# Patient Record
Sex: FEMALE | Race: WHITE | NOT HISPANIC OR LATINO | Employment: OTHER | URBAN - METROPOLITAN AREA
[De-identification: names, ages, dates, MRNs, and addresses within clinical notes are randomized per-mention and may not be internally consistent; named-entity substitution may affect disease eponyms.]

---

## 2022-08-29 ENCOUNTER — TRANSFERRED RECORDS (OUTPATIENT)
Dept: HEALTH INFORMATION MANAGEMENT | Facility: CLINIC | Age: 72
End: 2022-08-29

## 2022-08-29 LAB — ABSTRACT IFOB-NO CHARGE: NEGATIVE

## 2022-09-29 ENCOUNTER — ANCILLARY PROCEDURE (OUTPATIENT)
Dept: MAMMOGRAPHY | Facility: OTHER | Age: 72
End: 2022-09-29
Payer: COMMERCIAL

## 2022-09-29 DIAGNOSIS — Z12.31 VISIT FOR SCREENING MAMMOGRAM: ICD-10-CM

## 2022-09-29 PROCEDURE — 77063 BREAST TOMOSYNTHESIS BI: CPT | Mod: TC | Performed by: RADIOLOGY

## 2022-09-29 PROCEDURE — 77067 SCR MAMMO BI INCL CAD: CPT | Mod: TC | Performed by: RADIOLOGY

## 2022-10-26 ENCOUNTER — OFFICE VISIT (OUTPATIENT)
Dept: FAMILY MEDICINE | Facility: OTHER | Age: 72
End: 2022-10-26
Payer: COMMERCIAL

## 2022-10-26 VITALS
RESPIRATION RATE: 16 BRPM | OXYGEN SATURATION: 99 % | HEART RATE: 55 BPM | BODY MASS INDEX: 36.72 KG/M2 | WEIGHT: 199.56 LBS | SYSTOLIC BLOOD PRESSURE: 132 MMHG | DIASTOLIC BLOOD PRESSURE: 74 MMHG | TEMPERATURE: 97.4 F | HEIGHT: 62 IN

## 2022-10-26 DIAGNOSIS — R73.01 IMPAIRED FASTING GLUCOSE: ICD-10-CM

## 2022-10-26 DIAGNOSIS — Z00.00 ENCOUNTER FOR MEDICARE ANNUAL WELLNESS EXAM: ICD-10-CM

## 2022-10-26 DIAGNOSIS — E78.5 HYPERLIPIDEMIA, UNSPECIFIED HYPERLIPIDEMIA TYPE: Primary | ICD-10-CM

## 2022-10-26 DIAGNOSIS — E66.01 MORBID OBESITY (H): ICD-10-CM

## 2022-10-26 DIAGNOSIS — Z76.89 ESTABLISHING CARE WITH NEW DOCTOR, ENCOUNTER FOR: ICD-10-CM

## 2022-10-26 LAB
ALBUMIN SERPL-MCNC: 4 G/DL (ref 3.4–5)
ALP SERPL-CCNC: 74 U/L (ref 40–150)
ALT SERPL W P-5'-P-CCNC: 22 U/L (ref 0–50)
ANION GAP SERPL CALCULATED.3IONS-SCNC: 3 MMOL/L (ref 3–14)
AST SERPL W P-5'-P-CCNC: 18 U/L (ref 0–45)
BILIRUB SERPL-MCNC: 0.4 MG/DL (ref 0.2–1.3)
BUN SERPL-MCNC: 11 MG/DL (ref 7–30)
CALCIUM SERPL-MCNC: 9.3 MG/DL (ref 8.5–10.1)
CHLORIDE BLD-SCNC: 105 MMOL/L (ref 94–109)
CHOLEST SERPL-MCNC: 234 MG/DL
CO2 SERPL-SCNC: 31 MMOL/L (ref 20–32)
CREAT SERPL-MCNC: 0.7 MG/DL (ref 0.52–1.04)
FASTING STATUS PATIENT QL REPORTED: YES
GFR SERPL CREATININE-BSD FRML MDRD: >90 ML/MIN/1.73M2
GLUCOSE BLD-MCNC: 118 MG/DL (ref 70–99)
HBA1C MFR BLD: 5.7 % (ref 0–5.6)
HDLC SERPL-MCNC: 57 MG/DL
LDLC SERPL CALC-MCNC: 151 MG/DL
NONHDLC SERPL-MCNC: 177 MG/DL
POTASSIUM BLD-SCNC: 4 MMOL/L (ref 3.4–5.3)
PROT SERPL-MCNC: 7.2 G/DL (ref 6.8–8.8)
SODIUM SERPL-SCNC: 139 MMOL/L (ref 133–144)
TRIGL SERPL-MCNC: 131 MG/DL

## 2022-10-26 PROCEDURE — 80061 LIPID PANEL: CPT | Performed by: NURSE PRACTITIONER

## 2022-10-26 PROCEDURE — 36415 COLL VENOUS BLD VENIPUNCTURE: CPT | Performed by: NURSE PRACTITIONER

## 2022-10-26 PROCEDURE — 83036 HEMOGLOBIN GLYCOSYLATED A1C: CPT | Performed by: NURSE PRACTITIONER

## 2022-10-26 PROCEDURE — 80053 COMPREHEN METABOLIC PANEL: CPT | Performed by: NURSE PRACTITIONER

## 2022-10-26 PROCEDURE — 99214 OFFICE O/P EST MOD 30 MIN: CPT | Mod: 25 | Performed by: NURSE PRACTITIONER

## 2022-10-26 PROCEDURE — G0439 PPPS, SUBSEQ VISIT: HCPCS | Performed by: NURSE PRACTITIONER

## 2022-10-26 ASSESSMENT — ENCOUNTER SYMPTOMS: BACK PAIN: 1

## 2022-10-26 ASSESSMENT — PAIN SCALES - GENERAL: PAINLEVEL: NO PAIN (0)

## 2022-10-26 NOTE — Clinical Note
Dexa scan completed on 11/08/2007 [facility], Patient also had home FIT test and brought in results today will send to scanning.

## 2022-10-26 NOTE — PATIENT INSTRUCTIONS
Patient Education   Personalized Prevention Plan  You are due for the preventive services outlined below.  Your care team is available to assist you in scheduling these services.  If you have already completed any of these items, please share that information with your care team to update in your medical record.  Health Maintenance Due   Topic Date Due     Osteoporosis Screening  Never done     Discuss Advance Care Planning  Never done     Hepatitis B Vaccine (1 of 3 - 3-dose series) Never done     COVID-19 Vaccine (1) Never done     Colorectal Cancer Screening  Never done     Hepatitis C Screening  Never done     Cholesterol Lab  Never done     Zoster (Shingles) Vaccine (1 of 2) Never done     Pneumococcal Vaccine (1 - PCV) Never done     Flu Vaccine (1) Never done

## 2022-10-26 NOTE — PROGRESS NOTES
"  Assessment & Plan     Hyperlipidemia, unspecified hyperlipidemia type  Recommend rechecking today  Will advise based on levels.   - Lipid panel reflex to direct LDL Fasting; Future  - Comprehensive metabolic panel (BMP + Alb, Alk Phos, ALT, AST, Total. Bili, TP); Future  - Lipid panel reflex to direct LDL Fasting  - Comprehensive metabolic panel (BMP + Alb, Alk Phos, ALT, AST, Total. Bili, TP)    Impaired fasting glucose  Recommend recheck as she has been borderline in the past.   - Hemoglobin A1c; Future  - Comprehensive metabolic panel (BMP + Alb, Alk Phos, ALT, AST, Total. Bili, TP); Future  - Hemoglobin A1c  - Comprehensive metabolic panel (BMP + Alb, Alk Phos, ALT, AST, Total. Bili, TP)    Morbid obesity (H)  Working hard on weight loss, has lost 20lbs so far with walking and eating better.   Discussed continuing this and congratulated her on her progress.   - Comprehensive metabolic panel (BMP + Alb, Alk Phos, ALT, AST, Total. Bili, TP); Future  - Comprehensive metabolic panel (BMP + Alb, Alk Phos, ALT, AST, Total. Bili, TP)    Establishing care with new doctor, encounter for  Pleasant 72 year old here today to establish care.   Recommend return for pre-op and discuss concerns with labs at that time.   Doing well with some chronic low back pain that she notes is manageable at this time. Living alone for the first time in 53 years so adjusting, but feels that she finally feels like her home is \"home\"     Encounter for Medicare annual wellness exam  Updated chart as best we could  Had FIT testing done and brought in documents- will send to scanning to update chart and records  [facility] with no osteopenia or osteoporosis, abstract sent information for this.  Declined vaccines today   Will think about Lung cancer screening, discussed this today. Quit back in 2010, but did smoke for 43 years.     The patient indicates understanding of these issues and agrees with the plan.      BMI:   Estimated " "body mass index is 36.38 kg/m  as calculated from the following:    Height as of this encounter: 1.577 m (5' 2.1\").    Weight as of this encounter: 90.5 kg (199 lb 9 oz).   Weight management plan: Discussed healthy diet and exercise guidelines    Patient Instructions       Patient Education   Personalized Prevention Plan  You are due for the preventive services outlined below.  Your care team is available to assist you in scheduling these services.  If you have already completed any of these items, please share that information with your care team to update in your medical record.  Health Maintenance Due   Topic Date Due     Osteoporosis Screening  Never done     Discuss Advance Care Planning  Never done     Hepatitis B Vaccine (1 of 3 - 3-dose series) Never done     COVID-19 Vaccine (1) Never done     Colorectal Cancer Screening  Never done     Hepatitis C Screening  Never done     Cholesterol Lab  Never done     Zoster (Shingles) Vaccine (1 of 2) Never done     Pneumococcal Vaccine (1 - PCV) Never done     Flu Vaccine (1) Never done           Return in about 4 weeks (around 11/23/2022).    ANJANA Carrero [facility]    Simon Gonzalez is a 72 year old, presenting for the following health issues:  Establish Care and Back Pain      Back Pain     History of Present Illness       Reason for visit:  Meet & greet    She eats 0-1 servings of fruits and vegetables daily.She consumes 0 sweetened beverage(s) daily.She exercises with enough effort to increase her heart rate 10 to 19 minutes per day.  She exercises with enough effort to increase her heart rate 4 days per week.   She is taking medications regularly.       Annual Wellness Visit    Patient has been advised of split billing requirements and indicates understanding: Yes     Are you in the first 12 months of your Medicare Part B coverage?  No    Physical Health:    In general, how would you rate your overall physical health? " "excellent    Outside of work, how many days during the week do you exercise?4-5 days/week    Outside of work, approximately how many minutes a day do you exercise?15-30 minutes    If you drink alcohol do you typically have >3 drinks per day or >7 drinks per week? No    Do you usually eat at least 4 servings of fruit and vegetables a day, include whole grains & fiber and avoid regularly eating high fat or \"junk\" foods? NO    Do you have any problems taking medications regularly? No    Do you have any side effects from medications? not applicable    Needs assistance for the following daily activities: no assistance needed    Which of the following safety concerns are present in your home?  none identified     Hearing impairment: No    In the past 6 months, have you been bothered by leaking of urine? yes    Mental Health:    In general, how would you rate your overall mental or emotional health? good  PHQ-2 Score: 0    Do you feel safe in your environment? Yes    Have you ever done Advance Care Planning? (For example, a Health Directive, POLST, or a discussion with a medical provider or your loved ones about your wishes)? No, advance care planning information given to patient to review.  Patient declined advance care planning discussion at this time.    Fall risk:  Fallen 2 or more times in the past year?: No  Any fall with injury in the past year?: No    Cognitive Screenin) Repeat 3 items (Leader, Season, Table)    2) Clock draw: NORMAL  3) 3 item recall: Recalls 3 objects  Results: 3 items recalled: COGNITIVE IMPAIRMENT LESS LIKELY    Mini-CogTM Copyright SHIRA Woo. Licensed by the author for use in [facility]; reprinted with permission (juan [email or web address]). All rights reserved.      Patient is a   A little over a year ago her  passed way with covid-19. Had parkinson's as well. Patient passed within a year decreased progressively in a year.   Estate sale last April/May someone came in there " "with Covid-19 and that is how he got Covid-19.   Admitted him to [facility].  passed  Lived with daughter during that time as they were selling her house.   Moved into her home that she is in today.   Lost a daughter quite a few years ago as well.   Found out that once she was by herself and living on her own she realized she had a lot to grieve.   Was taking care of multiple family members.     Patient does a lot of walking to help   Bought a treadmill to help so she can walk during the winter without excuses. Gained a lot of weight last winter.   Lost 20lbs this summer  Daughter is getting  in January in the [facility].       Current providers sharing in care for this patient include:   Patient Care Team:  No Ref-Primary, Physician as PCP - General  System, Provider Not In (Clinic)    Patient has been advised of split billing requirements and indicates understanding: Yes  Pain History:  When did you first notice your pain? - Chronic Pain   Have you seen this provider for your pain in the past? No   Where in your body do your have pain? Low back  Are you seeing anyone else for your pain? No  What makes your pain better? heat  What makes your pain worse? Cold, use  How has pain affected your ability to work? Not applicable  Who lives in your household? patient      Low back pain  Chronic  Managable, vacuuming can cause pain. Takes tylenol to help.   Some days she has no pain.     Review of Systems   Musculoskeletal: Positive for back pain.        Objective    /74 (BP Location: Left arm, Patient Position: Sitting, Cuff Size: Adult Large)   Pulse 55   Temp 97.4  F (36.3  C) (Temporal)   Resp 16   Ht 1.577 m (5' 2.1\")   Wt 90.5 kg (199 lb 9 oz)   SpO2 99%   BMI 36.38 kg/m    Body mass index is 36.38 kg/m .  Physical Exam   GENERAL: healthy, alert and no distress  RESP: lungs clear to auscultation - no rales, rhonchi or wheezes  CV: regular rate and rhythm, normal S1 S2, no S3 or S4, no murmur, " click or rub, no peripheral edema and peripheral pulses strong  NEURO: Normal strength and tone, mentation intact and speech normal  PSYCH: mentation appears normal, affect normal/bright    Results for orders placed or performed in visit on 10/26/22   Hemoglobin A1c     Status: Abnormal   Result Value Ref Range    Hemoglobin A1C 5.7 (H) 0.0 - 5.6 %         The patient indicates understanding of these issues and agrees with the plan.

## 2022-11-09 ENCOUNTER — OFFICE VISIT (OUTPATIENT)
Dept: FAMILY MEDICINE | Facility: OTHER | Age: 72
End: 2022-11-09
Payer: COMMERCIAL

## 2022-11-09 ENCOUNTER — TELEPHONE (OUTPATIENT)
Dept: FAMILY MEDICINE | Facility: OTHER | Age: 72
End: 2022-11-09

## 2022-11-09 DIAGNOSIS — E78.5 HYPERLIPIDEMIA, UNSPECIFIED HYPERLIPIDEMIA TYPE: ICD-10-CM

## 2022-11-09 DIAGNOSIS — Z87.898 HISTORY OF CHEST PAIN: ICD-10-CM

## 2022-11-09 DIAGNOSIS — R73.01 IMPAIRED FASTING GLUCOSE: ICD-10-CM

## 2022-11-09 DIAGNOSIS — Z01.818 PREOP GENERAL PHYSICAL EXAM: Primary | ICD-10-CM

## 2022-11-09 DIAGNOSIS — E66.01 MORBID OBESITY (H): ICD-10-CM

## 2022-11-09 DIAGNOSIS — R73.03 PREDIABETES: ICD-10-CM

## 2022-11-09 DIAGNOSIS — H26.9 CATARACT OF BOTH EYES, UNSPECIFIED CATARACT TYPE: ICD-10-CM

## 2022-11-09 LAB
ANION GAP SERPL CALCULATED.3IONS-SCNC: 3 MMOL/L (ref 3–14)
BASOPHILS # BLD AUTO: 0 10E3/UL (ref 0–0.2)
BASOPHILS NFR BLD AUTO: 1 %
BUN SERPL-MCNC: 7 MG/DL (ref 7–30)
CALCIUM SERPL-MCNC: 9.1 MG/DL (ref 8.5–10.1)
CHLORIDE BLD-SCNC: 108 MMOL/L (ref 94–109)
CO2 SERPL-SCNC: 29 MMOL/L (ref 20–32)
CREAT SERPL-MCNC: 0.63 MG/DL (ref 0.52–1.04)
EOSINOPHIL # BLD AUTO: 0.1 10E3/UL (ref 0–0.7)
EOSINOPHIL NFR BLD AUTO: 2 %
ERYTHROCYTE [DISTWIDTH] IN BLOOD BY AUTOMATED COUNT: 14.2 % (ref 10–15)
GFR SERPL CREATININE-BSD FRML MDRD: >90 ML/MIN/1.73M2
GLUCOSE BLD-MCNC: 111 MG/DL (ref 70–99)
HCT VFR BLD AUTO: 41.9 % (ref 35–47)
HGB BLD-MCNC: 13.2 G/DL (ref 11.7–15.7)
LYMPHOCYTES # BLD AUTO: 1.7 10E3/UL (ref 0.8–5.3)
LYMPHOCYTES NFR BLD AUTO: 31 %
MCH RBC QN AUTO: 28.2 PG (ref 26.5–33)
MCHC RBC AUTO-ENTMCNC: 31.5 G/DL (ref 31.5–36.5)
MCV RBC AUTO: 90 FL (ref 78–100)
MONOCYTES # BLD AUTO: 0.4 10E3/UL (ref 0–1.3)
MONOCYTES NFR BLD AUTO: 8 %
NEUTROPHILS # BLD AUTO: 3.2 10E3/UL (ref 1.6–8.3)
NEUTROPHILS NFR BLD AUTO: 58 %
PLATELET # BLD AUTO: 279 10E3/UL (ref 150–450)
POTASSIUM BLD-SCNC: 4.2 MMOL/L (ref 3.4–5.3)
RBC # BLD AUTO: 4.68 10E6/UL (ref 3.8–5.2)
SODIUM SERPL-SCNC: 140 MMOL/L (ref 133–144)
WBC # BLD AUTO: 5.4 10E3/UL (ref 4–11)

## 2022-11-09 PROCEDURE — 85025 COMPLETE CBC W/AUTO DIFF WBC: CPT | Performed by: NURSE PRACTITIONER

## 2022-11-09 PROCEDURE — 80048 BASIC METABOLIC PNL TOTAL CA: CPT | Performed by: NURSE PRACTITIONER

## 2022-11-09 PROCEDURE — 93000 ELECTROCARDIOGRAM COMPLETE: CPT | Performed by: NURSE PRACTITIONER

## 2022-11-09 PROCEDURE — 36415 COLL VENOUS BLD VENIPUNCTURE: CPT | Performed by: NURSE PRACTITIONER

## 2022-11-09 PROCEDURE — 99214 OFFICE O/P EST MOD 30 MIN: CPT | Performed by: NURSE PRACTITIONER

## 2022-11-09 RX ORDER — ATORVASTATIN CALCIUM 10 MG/1
10 TABLET, FILM COATED ORAL DAILY
Qty: 90 TABLET | Refills: 0 | Status: CANCELLED | OUTPATIENT
Start: 2022-11-09

## 2022-11-09 ASSESSMENT — PAIN SCALES - GENERAL: PAINLEVEL: NO PAIN (0)

## 2022-11-09 NOTE — TELEPHONE ENCOUNTER
Left message on triage line for [facility] in [place] asking that they call back to give more detail on pt procedure. Please see questions below from provider.    Aniya Roach, ATIYAN, RN, PHN  Registered Nurse-Clinic Triage  [facility]  11/9/2022 at 12:04 PM

## 2022-11-09 NOTE — PROGRESS NOTES
[address]  [place] [address]  Phone: [phone]  Primary Provider: No Ref-Primary, Physician  Pre-op Performing Provider: VIDAL YOUNG    PREOPERATIVE EVALUATION:  Today's date: 11/9/2022    Lisa Camarena is a 72 year old female who presents for a preoperative evaluation.    Surgical Information:  Surgery/Procedure: Cataracts Surgery with implants   Surgery Location: [facility]- [facility]  Surgeon:   Surgery Date: 11/22/22 & 11/28/22  Time of Surgery: doesn't know yet  Where patient plans to recover: At home with family  Fax number for surgical facility: [phone]      Type of Anesthesia Anticipated: General    Assessment & Plan     The proposed surgical procedure is considered INTERMEDIATE risk.    Preop general physical exam    - EKG 12-lead complete w/read - Clinics  - CBC with platelets and differential; Future  - Basic metabolic panel  (Ca, Cl, CO2, Creat, Gluc, K, Na, BUN); Future  - Basic metabolic panel  (Ca, Cl, CO2, Creat, Gluc, K, Na, BUN)  - CBC with platelets and differential    Cataract of both eyes, unspecified cataract type  Recommendations for cataract surgery with implants per patien     Morbid obesity (H)  Work on diet and exercise   Recommend recheck A1C in 3 months.     Hyperlipidemia, unspecified hyperlipidemia type  Recommend statin therapy, patient declines.   The 10-year ASCVD risk score (Lola HAMLIN, et al., 2019) is: 13.3%    Values used to calculate the score:      Age: 72 years      Sex: Female      Is Non- : No      Diabetic: No      Tobacco smoker: No      Systolic Blood Pressure: 128 mmHg      Is BP treated: No      HDL Cholesterol: 57 mg/dL      Total Cholesterol: 234 mg/dL      Prediabetes  Exercise and diet     History of chest pain  See below.     Addendum 11/16/22  BP Readings from Last 3 Encounters:   11/16/22 128/72   11/09/22 (!) 142/84   10/26/22 132/74      BP improved       Risks and Recommendations:  The patient has the following additional risks and recommendations for perioperative complications:  Cardiovascular:   - Patient with a history of chest pain 1 year ago. Had cardiovascular work up which included a stress echocardiogram. This did not show any concerns for ischemia. However, the stress ECG was abnormal with horizontal ST depressions in recovery which did resolve after 6 minutes. Patient was recommended to have a Coronary CTA, which she has not completed. Of note patient also has a ACVD score of 18.4% and has not been on a statin and has refused.  Patient was able to complete her exercise for 6 minutes and completed 7 METS. She declines any chest pain or shortness of breath on todays exam and history. She notes she is able to walk up and down stairs without any concerns and does basic cleaning without any troubles. Denies PND. Given her history I advised she see a cardiologist before anesthesia. However, she wanted to have this ran by anesthesia first given it is a low risk procedure. I do recommend cardiac evaluation in the future.     EKG 11/09/22   Sinus  Bradycardia   -Old anteroseptal infarct.     ABNORMAL         The 10-year ASCVD risk score ([place] DK, et al., 2019) is: 13.3%    Values used to calculate the score:      Age: 72 years      Sex: Female      Is Non- : No      Diabetic: No      Tobacco smoker: No      Systolic Blood Pressure: 128 mmHg      Is BP treated: No      HDL Cholesterol: 57 mg/dL      Total Cholesterol: 234 mg/dL      08/19/2021 [facility]  FINAL CONCLUSIONS   1. EXERCISE Stress Echocardiogram is probably NORMAL.   2. The patient exercised for 6 minutes and 28 seconds on the Azeem   protocol completing 7 METS.   3. Echocardiogram showed no regional wall motion abnormalities.   4. Stress ECG was abnormal.  The patient developed horizontal ST   depressions in recovery which resolved after 6  minutes. She also developed   nonspecific changes in her inferior leads.   5. Consider coronary CTA for further evaluation.       Prediabetes- diet and exercise control.       Medication Instructions:  Patient is on no chronic medications    RECOMMENDATION:  Patient referred to anesthesia at [facility]  for evaluation before surgery. See cardiac concerns.     Review of prior external note(s) from - [facility] information from [facility]  reviewed        Subjective     HPI related to upcoming procedure:    Preop Questions 11/9/2022   1. Have you ever had a heart attack or stroke? No   2. Have you ever had surgery on your heart or blood vessels, such as a stent placement, a coronary artery bypass, or surgery on an artery in your head, neck, heart, or legs? No   3. Do you have chest pain with activity? No   4. Do you have a history of  heart failure? No   5. Do you currently have a cold, bronchitis or symptoms of other infection? No   6. Do you have a cough, shortness of breath, or wheezing? No   7. Do you or anyone in your family have previous history of blood clots? No   8. Do you or does anyone in your family have a serious bleeding problem such as prolonged bleeding following surgeries or cuts? No   9. Have you ever had problems with anemia or been told to take iron pills? YES - Haven't had issues in the last 10-12 years.    10. Have you had any abnormal blood loss such as black, tarry or bloody stools, or abnormal vaginal bleeding? No   11. Have you ever had a blood transfusion? YES - No issues this was when her first child was born    11a. Have you ever had a transfusion reaction? No   12. Are you willing to have a blood transfusion if it is medically needed before, during, or after your surgery? Yes   13. Have you or any of your relatives ever had problems with anesthesia? No   14. Do you have sleep apnea, excessive snoring or daytime drowsiness? No   15. Do you have any artifical  heart valves or other implanted medical devices like a pacemaker, defibrillator, or continuous glucose monitor? No   16. Do you have artificial joints? No   17. Are you allergic to latex? No     Notes she comes out of anesthesia slowly.     Health Care Directive:  Patient does not have a Health Care Directive or Living Will: Discussed advance care planning with patient; however, patient declined at this time.    Preoperative Review of :   reviewed - no record of controlled substances prescribed.      Status of Chronic Conditions:  HYPERLIPIDEMIA - Patient has a long history of significant Hyperlipidemia requiring medication for treatment with recent poor control. Declines statin therapy.     HYPERTENSION - Patient has longstanding history of HTN , currently denies any symptoms referable to elevated blood pressure. Specifically denies chest pain, palpitations, dyspnea, orthopnea, PND or peripheral edema. Blood pressure readings have not been in normal range. Patient is not currently on any medication. Recommend blood pressure check in 1 week and will update surgical team.       BP Readings from Last 3 Encounters:   11/16/22 128/72   11/09/22 (!) 142/84   10/26/22 132/74     Prediabetes- Diet and exercise control with A1C at 5.7.     See cardiovascular note above.     Review of Systems  CONSTITUTIONAL: NEGATIVE for fever, chills, change in weight  INTEGUMENTARY/SKIN: NEGATIVE for worrisome rashes, moles or lesions  EYES: NEGATIVE for vision changes or irritation  ENT/MOUTH: NEGATIVE for ear, mouth and throat problems  RESP: NEGATIVE for significant cough or SOB  CV: NEGATIVE for chest pain, palpitations or peripheral edema  GI: NEGATIVE for nausea, abdominal pain, heartburn, or change in bowel habits  : NEGATIVE for frequency, dysuria, or hematuria  MUSCULOSKELETAL: NEGATIVE for significant arthralgias or myalgia  NEURO: NEGATIVE for weakness, dizziness or paresthesias  ENDOCRINE: NEGATIVE for temperature  "intolerance, skin/hair changes  HEME: NEGATIVE for bleeding problems  PSYCHIATRIC: NEGATIVE for changes in mood or affect    Patient Active Problem List    Diagnosis Date Noted     Morbid obesity (H) 10/26/2022     Priority: Medium     Hyperlipidemia 2014     Priority: Medium     Formatting of this note might be different from the original.  , 163       Impaired fasting glucose 2014     Priority: Medium     Formatting of this note might be different from the original.   in , 118 in .       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  ICD 10       Obesity 10/30/2007     Priority: Medium      No past medical history on file.  No past surgical history on file.  Current Outpatient Medications   Medication Sig Dispense Refill     Calcium Acetate, Phos Binder, (CALCIUM ACETATE PO)        omeprazole (PRILOSEC) 20 MG DR capsule Take 20 mg by mouth as needed       vitamin B complex with vitamin C (STRESS TAB) tablet Take 1 tablet by mouth daily         Allergies   Allergen Reactions     Sulfamethoxazole-Trimethoprim GI Disturbance     Abdominal cramping        Social History     Tobacco Use     Smoking status: Former     Packs/day: 0.50     Years: 43.00     Pack years: 21.50     Types: Cigarettes     Start date: 1967     Quit date: 2010     Years since quittin.8     Smokeless tobacco: Never   Substance Use Topics     Alcohol use: Yes     No family history on file.  History   Drug Use Unknown         Objective     BP (!) 142/84   Pulse 68   Temp 98.5  F (36.9  C) (Oral)   Ht 1.595 m (5' 2.8\")   Wt 92.1 kg (203 lb)   SpO2 94%   BMI 36.20 kg/m      Physical Exam    GENERAL APPEARANCE: healthy, alert and no distress     EYES: EOMI, PERRL     HENT: ear canals and TM's normal and nose and mouth without ulcers or lesions     NECK: no adenopathy, no " asymmetry, masses, or scars and thyroid normal to palpation     RESP: lungs clear to auscultation - no rales, rhonchi or wheezes     CV: regular rates and rhythm, normal S1 S2, no S3 or S4 and no murmur, click or rub     ABDOMEN:  soft, nontender, no HSM or masses and bowel sounds normal     MS: extremities normal- no gross deformities noted, no evidence of inflammation in joints, FROM in all extremities.     SKIN: no suspicious lesions or rashes     NEURO: Normal strength and tone, sensory exam grossly normal, mentation intact and speech normal     PSYCH: mentation appears normal. and affect normal/bright     LYMPHATICS: No cervical adenopathy    Recent Labs   Lab Test 10/26/22  1423      POTASSIUM 4.0   CR 0.70   A1C 5.7*        Diagnostics:    EKG 11/09/22   Sinus  Bradycardia   -Old anteroseptal infarct.     Recent Results (from the past 168 hour(s))   Basic metabolic panel  (Ca, Cl, CO2, Creat, Gluc, K, Na, BUN)    Collection Time: 11/09/22 12:06 PM   Result Value Ref Range    Sodium 140 133 - 144 mmol/L    Potassium 4.2 3.4 - 5.3 mmol/L    Chloride 108 94 - 109 mmol/L    Carbon Dioxide (CO2) 29 20 - 32 mmol/L    Anion Gap 3 3 - 14 mmol/L    Urea Nitrogen 7 7 - 30 mg/dL    Creatinine 0.63 0.52 - 1.04 mg/dL    Calcium 9.1 8.5 - 10.1 mg/dL    Glucose 111 (H) 70 - 99 mg/dL    GFR Estimate >90 >60 mL/min/1.73m2   CBC with platelets and differential    Collection Time: 11/09/22 12:06 PM   Result Value Ref Range    WBC Count 5.4 4.0 - 11.0 10e3/uL    RBC Count 4.68 3.80 - 5.20 10e6/uL    Hemoglobin 13.2 11.7 - 15.7 g/dL    Hematocrit 41.9 35.0 - 47.0 %    MCV 90 78 - 100 fL    MCH 28.2 26.5 - 33.0 pg    MCHC 31.5 31.5 - 36.5 g/dL    RDW 14.2 10.0 - 15.0 %    Platelet Count 279 150 - 450 10e3/uL    % Neutrophils 58 %    % Lymphocytes 31 %    % Monocytes 8 %    % Eosinophils 2 %    % Basophils 1 %    Absolute Neutrophils 3.2 1.6 - 8.3 10e3/uL    Absolute Lymphocytes 1.7 0.8 - 5.3 10e3/uL    Absolute Monocytes 0.4  0.0 - 1.3 10e3/uL    Absolute Eosinophils 0.1 0.0 - 0.7 10e3/uL    Absolute Basophils 0.0 0.0 - 0.2 10e3/uL          [facility]   Stress echocardiograms 08/19/2021     FINAL CONCLUSIONS   1. EXERCISE Stress Echocardiogram is probably NORMAL.   2. The patient exercised for 6 minutes and 28 seconds on the Azeem   protocol completing 7 METS.   3. Echocardiogram showed no regional wall motion abnormalities.   4. Stress ECG was abnormal.  The patient developed horizontal ST   depressions in recovery which resolved after 6 minutes. She also developed   nonspecific changes in her inferior leads.   5. Consider coronary CTA for further evaluation.     However EKG changes during recovering cardiology recommended a CTA of Coronary arteries for further evaluation.         Revised Cardiac Risk Index (RCRI):  The patient has the following serious cardiovascular risks for perioperative complications:   - Coronary Artery Disease (MI, positive stress test, angina, Qs on EKG) = 1 point     RCRI Interpretation: 1 point: Class II (low risk - 0.9% complication rate)           Signed Electronically by: ANJANA Carrero CNP  Copy of this evaluation report is provided to requesting physician.

## 2022-11-09 NOTE — PATIENT INSTRUCTIONS
Preparing for Your Surgery  Getting started  A nurse will call you to review your health history and instructions. They will give you an arrival time based on your scheduled surgery time. Please be ready to share:    Your doctor s clinic name and phone number    Your medical, surgical, and anesthesia history    A list of allergies and sensitivities    A list of medicines, including herbal treatments and over-the-counter drugs    Whether the patient has a legal guardian (ask how to send us the papers in advance)  Please tell us if you re pregnant--or if there s any chance you might be pregnant. Some surgeries may injure a fetus (unborn baby), so they require a pregnancy test. Surgeries that are safe for a fetus don t always need a test, and you can choose whether to have one.   If you have a child who s having surgery, please ask for a copy of Preparing for Your Child s Surgery.    Preparing for surgery    Within 10 to 30 days of surgery: Have a pre-op exam (sometimes called an H&P, or History and Physical). This can be done at a clinic or pre-operative center.  ? If you re having a , you may not need this exam. Talk to your care team.    At your pre-op exam, talk to your care team about all medicines you take. If you need to stop any medicines before surgery, ask when to start taking them again.  ? We do this for your safety. Many medicines can make you bleed too much during surgery. Some change how well surgery (anesthesia) drugs work.    Call your insurance company to let them know you re having surgery. (If you don t have insurance, call [phone].)    Call your clinic if there s any change in your health. This includes signs of a cold or flu (sore throat, runny nose, cough, rash, fever). It also includes a scrape or scratch near the surgery site.    If you have questions on the day of surgery, call your hospital or surgery center.  COVID testing  You may need to be tested for COVID-19 before having  surgery. If so, we will give you instructions (or click here).  Eating and drinking guidelines  For your safety: Unless your surgeon tells you otherwise, follow the guidelines below.    Eat and drink as usual until 8 hours before you arrive for surgery. After that, no food or milk.    Drink clear liquids until 2 hours before you arrive. These are liquids you can see through, like water, Gatorade, and Propel Water. They also include plain black coffee and tea (no cream or milk), candy, and breath mints. You can spit out gum when you arrive.    If you drink alcohol: Stop drinking it the night before surgery.    If your care team tells you to take medicine on the morning of surgery, it s okay to take it with a sip of water.  Preventing infection    Shower or bathe the night before and morning of your surgery. Follow the instructions your clinic gave you. (If no instructions, use regular soap.)    Don t shave or clip hair near your surgery site. We ll remove the hair if needed.    Don t smoke or vape the morning of surgery. You may chew nicotine gum up to 2 hours before surgery. A nicotine patch is okay.  ? Note: Some surgeries require you to completely quit smoking and nicotine. Check with your surgeon.    Your care team will make every effort to keep you safe from infection. We will:  ? Clean our hands often with soap and water (or an alcohol-based hand rub).  ? Clean the skin at your surgery site with a special soap that kills germs.  ? Give you a special gown to keep you warm. (Cold raises the risk of infection.)  ? Wear special hair covers, masks, gowns and gloves during surgery.  ? Give antibiotic medicine, if prescribed. Not all surgeries need antibiotics.  What to bring on the day of surgery    Photo ID and insurance card    Copy of your health care directive, if you have one    Glasses and hearing aids (bring cases)  ? You can t wear contacts during surgery    Inhaler and eye drops, if you use them (tell us  about these when you arrive)    CPAP machine or breathing device, if you use them    A few personal items, if spending the night    If you have . . .  ? A pacemaker, ICD (cardiac defibrillator) or other implant: Bring the ID card.  ? An implanted stimulator: Bring the remote control.  ? A legal guardian: Bring a copy of the certified (court-stamped) guardianship papers.  Please remove any jewelry, including body piercings. Leave jewelry and other valuables at home.  If you re going home the day of surgery    You must have a responsible adult drive you home. They should stay with you overnight as well.    If you don t have someone to stay with you, and you aren t safe to go home alone, we may keep you overnight. Insurance often won t pay for this.  After surgery  If it s hard to control your pain or you need more pain medicine, please call your surgeon s office.  Questions?   If you have any questions for your care team, list them here:   ____________________________________________________________________________________________________________________________________________________________________________________________________________________________________________________________________  For informational purposes only. Not to replace the advice of your health care provider. Copyright   2003, 2019 [employer]. All rights reserved. Clinically reviewed by Beulah Castillo MD. [employer] 161091 - REV 10/22.

## 2022-11-09 NOTE — TELEPHONE ENCOUNTER
RN;s please call patient surgical center and request more information on the type anesthesia as she is having cataracts with implants and I need to know if it is general I need to do additional testing on her.       Jena Ferreira, ANJANA CNP  Questions or concerns please feel free to send me a [employer] message or call me  Phone : [phone]

## 2022-11-10 NOTE — TELEPHONE ENCOUNTER
Spoke with Renita at surgery scheduling center.  She states that patient will be having MAC;  Will have light sedation for her cataract surgery; will not be put completely under.  No general anesthesia.   Ninfa Mcnair RN

## 2022-11-10 NOTE — TELEPHONE ENCOUNTER
Ok so IV can mean a lot of things. Is this General anesthesia where she is completely put under?      ANJANA Carrero CNP  Questions or concerns please feel free to send me a [employer] message or call me  Phone : [phone]

## 2022-11-10 NOTE — TELEPHONE ENCOUNTER
Rani from [facility] in [place] calling back. She states that the patient will be having IV sedation.     IF there are any other questions the team can call the [facility] at [phone].       Piero Reddy, ATIYAN, RN, PHN  [place]  November 10, 2022

## 2022-11-11 ENCOUNTER — TELEPHONE (OUTPATIENT)
Dept: FAMILY MEDICINE | Facility: OTHER | Age: 72
End: 2022-11-11

## 2022-11-11 VITALS
WEIGHT: 203 LBS | SYSTOLIC BLOOD PRESSURE: 142 MMHG | OXYGEN SATURATION: 94 % | DIASTOLIC BLOOD PRESSURE: 84 MMHG | BODY MASS INDEX: 35.97 KG/M2 | TEMPERATURE: 98.5 F | HEIGHT: 63 IN | HEART RATE: 68 BPM

## 2022-11-11 NOTE — TELEPHONE ENCOUNTER
Please call patient I would say that since it is a low anesthetic she could go through surgery just fine, however, given the concerns from her cardiac work  Up a year ago. I highly recommend that she see cardiology first and then proceed with surgery. Please let me know what she would like to do. I can certainly document this risk and leave it up to the eye surgeons to decide if they are ok with this risk.       ANJANA Carrero CNP  Questions or concerns please feel free to send me a [employer] message or call me  Phone : [phone]

## 2022-11-11 NOTE — TELEPHONE ENCOUNTER
Contacted pt and advised her of message from provider. Pt states that she would like to see cardiology but her surgery is scheduled for the 22nd and she really wants to have the surgery done and does not believe that she would be able to see the cardiologist prior to the 22nd. Given these factors, pt asks that KV just note the risk and let the surgeons decide.     Aniya Roach, ATIYAN, RN, PHN  Registered Nurse-Clinic Triage  [facility]  11/11/2022 at 11:06 AM

## 2022-11-11 NOTE — TELEPHONE ENCOUNTER
Spoke with Lisa and read back verbatim of what Jnea Ferreira wrote.    Did schedule a BP check for Wednesday on 11/16/22 at 9am on the RN schedule.    Drea Tyson RN  [facility] ~ Registered Nurse  Clinic Triage ~ [place] [facility] [place]  November 11, 2022

## 2022-11-11 NOTE — TELEPHONE ENCOUNTER
Please fax pre-op and attention anesthesia.       ANJANA Carrero CNP  Questions or concerns please feel free to send me a [employer] message or call me  Phone : [phone]

## 2022-11-11 NOTE — TELEPHONE ENCOUNTER
I will send pre-op when I close it, in the meantime I recommend a bp check on the float schedule as well next week.       ANJANA Carrero CNP  Questions or concerns please feel free to send me a [employer] Liaison message or call me  Phone : [phone]

## 2022-11-16 ENCOUNTER — ALLIED HEALTH/NURSE VISIT (OUTPATIENT)
Dept: FAMILY MEDICINE | Facility: OTHER | Age: 72
End: 2022-11-16
Payer: COMMERCIAL

## 2022-11-16 ENCOUNTER — TELEPHONE (OUTPATIENT)
Dept: FAMILY MEDICINE | Facility: OTHER | Age: 72
End: 2022-11-16

## 2022-11-16 VITALS — SYSTOLIC BLOOD PRESSURE: 128 MMHG | DIASTOLIC BLOOD PRESSURE: 72 MMHG

## 2022-11-16 DIAGNOSIS — Z01.30 BP CHECK: Primary | ICD-10-CM

## 2022-11-16 PROCEDURE — 99207 PR NO CHARGE NURSE ONLY: CPT

## 2022-11-16 NOTE — PROGRESS NOTES
I met with Lisa Camarena at the request of Jena Ferreira to recheck her blood pressure.  Blood pressure medications on the med list were reviewed with patient.    Patient has taken all medications as per usual regimen: not on blood pressure medications.  Patient reports tolerating them without any issues or concerns: NA    Vitals:    11/16/22 0900   BP: 128/72   BP Location: Right arm   Patient Position: Sitting   Cuff Size: Adult Large       Blood pressure was taken, previous encounter was reviewed, recorded blood pressure below 140/90.  Patient was discharged and the note will be sent to the provider for final review.     Macrina RAJPUTN, RN

## 2022-11-16 NOTE — TELEPHONE ENCOUNTER
Please fax pre-op and attention addendum as I updated this.       ANJANA Carrero CNP  Questions or concerns please feel free to send me a [employer] message or call me  Phone : [phone]

## 2022-11-16 NOTE — TELEPHONE ENCOUNTER
Blood pressure improved please let patient know I will update her pre-op      ANJANA Carrero CNP  Questions or concerns please feel free to send me a [employer] message or call me  Phone : [phone]

## 2022-11-21 ENCOUNTER — TELEPHONE (OUTPATIENT)
Dept: FAMILY MEDICINE | Facility: OTHER | Age: 72
End: 2022-11-21

## 2022-11-21 DIAGNOSIS — R94.39 ABNORMAL CARDIOVASCULAR STRESS TEST: Primary | ICD-10-CM

## 2022-11-21 DIAGNOSIS — E78.5 HYPERLIPIDEMIA, UNSPECIFIED HYPERLIPIDEMIA TYPE: ICD-10-CM

## 2022-11-21 NOTE — TELEPHONE ENCOUNTER
I placed a cardiology referral for her through [facility], I would start here.     ANJANA Carrero CNP  Questions or concerns please feel free to send me a [employer] message or call me  Phone : [phone]

## 2022-11-21 NOTE — TELEPHONE ENCOUNTER
Attempted to call patient, line busy. LM with information for Cardiology scheduling. Advised patient to call back to the clinic with any questions or concerns.

## 2022-11-21 NOTE — TELEPHONE ENCOUNTER
Patient called and said they cancelled her eye surgery because she has not seen a cardiolgist.      Patient does not know where or how to look for one and she is looking for a recommendation from pcp.

## 2023-01-18 ENCOUNTER — OFFICE VISIT (OUTPATIENT)
Dept: CARDIOLOGY | Facility: CLINIC | Age: 73
End: 2023-01-18
Attending: NURSE PRACTITIONER
Payer: COMMERCIAL

## 2023-01-18 VITALS
DIASTOLIC BLOOD PRESSURE: 82 MMHG | BODY MASS INDEX: 35.45 KG/M2 | WEIGHT: 200.1 LBS | HEART RATE: 62 BPM | SYSTOLIC BLOOD PRESSURE: 126 MMHG | HEIGHT: 63 IN | OXYGEN SATURATION: 97 %

## 2023-01-18 DIAGNOSIS — E78.5 HYPERLIPIDEMIA, UNSPECIFIED HYPERLIPIDEMIA TYPE: ICD-10-CM

## 2023-01-18 DIAGNOSIS — R94.39 ABNORMAL CARDIOVASCULAR STRESS TEST: ICD-10-CM

## 2023-01-18 PROCEDURE — 99204 OFFICE O/P NEW MOD 45 MIN: CPT | Performed by: INTERNAL MEDICINE

## 2023-01-18 NOTE — PROGRESS NOTES
CARDIOLOGY CONSULT    REASON FOR CONSULT: abnormal stress test, preop    PRIMARY CARE PHYSICIAN:  Physician No Ref-Primary    HISTORY OF PRESENT ILLNESS:  Ms. Camarena is a pleasant 72 year old woman with PMH significant for hyperlipidemia, impaired fasting glucose who presents for the evaluation of abnormal stress test obtained in .  She states she had plans to undergo cataract surgery, however, this was cancelled until she had a cardiac evalaution.  Lisa states that at the time of the stress test she was under increased stress as related to the death of her  and moving in with her daughters family.  She noted chest pressure that would occur at random and was increased when she felt more acute stress.  For further evaluation she underwent an exercise stress echocardiogram which demonstrated normal LV function and no stress induced wall motion abnormalities.  However, she had ECG changes in recovery.   It was recommended she undergo a coronary CTA for further evaluation however states she did not go through with this at the time as she was without insurance.  She states since her stress levels have improved, her symptoms have resolved.  She has declined statin therapy in the past as she felt that the statin caused a lot of additional medical issues for her .      PAST MEDICAL HISTORY:  No past medical history on file.    MEDICATIONS:  Current Outpatient Medications   Medication     Calcium Acetate, Phos Binder, (CALCIUM ACETATE PO)     omeprazole (PRILOSEC) 20 MG DR capsule     vitamin B complex with vitamin C (STRESS TAB) tablet     No current facility-administered medications for this visit.       ALLERGIES:  Allergies   Allergen Reactions     Sulfamethoxazole-Trimethoprim GI Disturbance     Abdominal cramping       SOCIAL HISTORY:  1/2 pack per day for 10 years.  Quit smoking 13 years ago.   Occasional ETOH    FAMILY HISTORY:  Dad  of heart attack 58, aunts and uncles with heart disease  in their 50's.    REVIEW OF SYSTEMS:  A complete ROS was obtained and the pertinent positives are outlined in the history of present illness above.  The remainder of systems is negative.      PHYSICAL EXAM:      BP: 126/82 Pulse: 62     SpO2: 97 %      Vital Signs with Ranges  Pulse:  [62] 62  BP: (126)/(82) 126/82  SpO2:  [97 %] 97 %  200 lbs 1.6 oz    Constitutional: awake, alert, no distress  Eyes: PERRL, sclera nonicteric  ENT: trachea midline  Respiratory: CTAB  Cardiovascular: RRR no m/r/g, no JVD  GI: nondistended, nontender, bowel sounds present  Lymph/Hematologic: no lymphadenopathy  Skin: dry, no rash  Musculoskeletal: good muscle tone, no edema bilaterlaly  Neurologic: no focal deficits  Neuropsychiatric: appropriate affact    DATA:  Labs:   Labs dated 10/26/22 reviewed personally   mg/dl, HDL 57 mg/dL    Dated 8/19/21  1. EXERCISE Stress Echocardiogram is probably NORMAL.   2. The patient exercised for 6 minutes and 28 seconds on the Azeem   protocol completing 7 METS.   3. Echocardiogram showed no regional wall motion abnormalities.   4. Stress ECG was abnormal.  The patient developed horizontal ST   depressions in recovery which resolved after 6 minutes. She also developed   nonspecific changes in her inferior leads.   5. Consider coronary CTA for further evaluation.       ASSESSMENT:  1.  Abnormal stress test:  With ECG changes.  Imaging was unremarkable.  Symptoms of chest pain currently quiescent  2.  Hyperlipidemia;  Has declined statin therapy  3.  Prior tobacco abuse  4.  Family hx of early CAD      RECOMMENDATIONS:  1. Given risk factors for CAD and prior abnormal stress test, recommend coronary CTA for definitive evaluation.  If this is without concerning obstructive CAD, she is medically optimized to proceed with surgery  2.  We discussed statin therapy; she has declined.      Sandra Presley MD [facility]  January 18, 2023

## 2023-01-18 NOTE — PATIENT INSTRUCTIONS
-Schedule coronary CTA  -Dr. Presley will review results of coronary CTA and provide additional recommendations

## 2023-01-18 NOTE — LETTER
1/18/2023    Physician No Ref-Primary  No address on file    RE: Lisa Camarena       Dear Colleague,     I had the pleasure of seeing Lisa Camarena in the [facility].  CARDIOLOGY CONSULT    REASON FOR CONSULT: abnormal stress test, preop    PRIMARY CARE PHYSICIAN:  Physician No Ref-Primary    HISTORY OF PRESENT ILLNESS:  Ms. Camarena is a pleasant 72 year old woman with PMH significant for hyperlipidemia, impaired fasting glucose who presents for the evaluation of abnormal stress test obtained in 2021.  She states she had plans to undergo cataract surgery, however, this was cancelled until she had a cardiac evalaution.  Lisa states that at the time of the stress test she was under increased stress as related to the death of her  and moving in with her daughters family.  She noted chest pressure that would occur at random and was increased when she felt more acute stress.  For further evaluation she underwent an exercise stress echocardiogram which demonstrated normal LV function and no stress induced wall motion abnormalities.  However, she had ECG changes in recovery.   It was recommended she undergo a coronary CTA for further evaluation however states she did not go through with this at the time as she was without insurance.  She states since her stress levels have improved, her symptoms have resolved.  She has declined statin therapy in the past as she felt that the statin caused a lot of additional medical issues for her .      PAST MEDICAL HISTORY:  No past medical history on file.    MEDICATIONS:  Current Outpatient Medications   Medication     Calcium Acetate, Phos Binder, (CALCIUM ACETATE PO)     omeprazole (PRILOSEC) 20 MG DR capsule     vitamin B complex with vitamin C (STRESS TAB) tablet     No current facility-administered medications for this visit.       ALLERGIES:  Allergies   Allergen Reactions     Sulfamethoxazole-Trimethoprim GI Disturbance      Abdominal cramping       SOCIAL HISTORY:  1/2 pack per day for 10 years.  Quit smoking 13 years ago.   Occasional ETOH    FAMILY HISTORY:  Dad  of heart attack 58, aunts and uncles with heart disease in their 50's.    REVIEW OF SYSTEMS:  A complete ROS was obtained and the pertinent positives are outlined in the history of present illness above.  The remainder of systems is negative.      PHYSICAL EXAM:      BP: 126/82 Pulse: 62     SpO2: 97 %      Vital Signs with Ranges  Pulse:  [62] 62  BP: (126)/(82) 126/82  SpO2:  [97 %] 97 %  200 lbs 1.6 oz    Constitutional: awake, alert, no distress  Eyes: PERRL, sclera nonicteric  ENT: trachea midline  Respiratory: CTAB  Cardiovascular: RRR no m/r/g, no JVD  GI: nondistended, nontender, bowel sounds present  Lymph/Hematologic: no lymphadenopathy  Skin: dry, no rash  Musculoskeletal: good muscle tone, no edema bilaterlaly  Neurologic: no focal deficits  Neuropsychiatric: appropriate affact    DATA:  Labs:   Labs dated 10/26/22 reviewed personally   mg/dl, HDL 57 mg/dL    Dated 21  1. EXERCISE Stress Echocardiogram is probably NORMAL.   2. The patient exercised for 6 minutes and 28 seconds on the Azeem   protocol completing 7 METS.   3. Echocardiogram showed no regional wall motion abnormalities.   4. Stress ECG was abnormal.  The patient developed horizontal ST   depressions in recovery which resolved after 6 minutes. She also developed   nonspecific changes in her inferior leads.   5. Consider coronary CTA for further evaluation.       ASSESSMENT:  1.  Abnormal stress test:  With ECG changes.  Imaging was unremarkable.  Symptoms of chest pain currently quiescent  2.  Hyperlipidemia;  Has declined statin therapy  3.  Prior tobacco abuse  4.  Family hx of early CAD      RECOMMENDATIONS:  1. Given risk factors for CAD and prior abnormal stress test, recommend coronary CTA for definitive evaluation.  If this is without concerning obstructive CAD, she is  medically optimized to proceed with surgery  2.  We discussed statin therapy; she has declined.              Thank you for allowing me to participate in the care of your patient.    Sincerely,     Sandra Presley MD     [facility]

## 2023-02-01 ENCOUNTER — HOSPITAL ENCOUNTER (OUTPATIENT)
Dept: CARDIOLOGY | Facility: CLINIC | Age: 73
Discharge: HOME | End: 2023-02-01
Attending: INTERNAL MEDICINE
Payer: COMMERCIAL

## 2023-02-01 VITALS — HEART RATE: 62 BPM | DIASTOLIC BLOOD PRESSURE: 76 MMHG | SYSTOLIC BLOOD PRESSURE: 149 MMHG

## 2023-02-01 DIAGNOSIS — E78.5 HYPERLIPIDEMIA, UNSPECIFIED HYPERLIPIDEMIA TYPE: ICD-10-CM

## 2023-02-01 DIAGNOSIS — R94.39 ABNORMAL CARDIOVASCULAR STRESS TEST: ICD-10-CM

## 2023-02-01 LAB
CREAT BLD-MCNC: 0.7 MG/DL (ref 0.5–1)
GFR SERPL CREATININE-BSD FRML MDRD: >60 ML/MIN/1.73M2

## 2023-02-01 PROCEDURE — 250N000013 HC RX MED GY IP 250 OP 250 PS 637: Performed by: INTERNAL MEDICINE

## 2023-02-01 PROCEDURE — 75574 CT ANGIO HRT W/3D IMAGE: CPT | Mod: 26 | Performed by: INTERNAL MEDICINE

## 2023-02-01 PROCEDURE — 82565 ASSAY OF CREATININE: CPT

## 2023-02-01 PROCEDURE — 250N000011 HC RX IP 250 OP 636: Performed by: INTERNAL MEDICINE

## 2023-02-01 PROCEDURE — 75574 CT ANGIO HRT W/3D IMAGE: CPT

## 2023-02-01 RX ORDER — IOPAMIDOL 755 MG/ML
500 INJECTION, SOLUTION INTRAVASCULAR ONCE
Status: COMPLETED | OUTPATIENT
Start: 2023-02-01 | End: 2023-02-01

## 2023-02-01 RX ORDER — METOPROLOL TARTRATE 25 MG/1
25-100 TABLET, FILM COATED ORAL
Status: COMPLETED | OUTPATIENT
Start: 2023-02-01 | End: 2023-02-01

## 2023-02-01 RX ORDER — DILTIAZEM HCL 60 MG
120 TABLET ORAL
Status: DISCONTINUED | OUTPATIENT
Start: 2023-02-01 | End: 2023-02-02 | Stop reason: HOSPADM

## 2023-02-01 RX ORDER — ACYCLOVIR 200 MG/1
0-1 CAPSULE ORAL
Status: DISCONTINUED | OUTPATIENT
Start: 2023-02-01 | End: 2023-02-02 | Stop reason: HOSPADM

## 2023-02-01 RX ORDER — ONDANSETRON 2 MG/ML
4 INJECTION INTRAMUSCULAR; INTRAVENOUS
Status: DISCONTINUED | OUTPATIENT
Start: 2023-02-01 | End: 2023-02-02 | Stop reason: HOSPADM

## 2023-02-01 RX ORDER — DIPHENHYDRAMINE HYDROCHLORIDE 50 MG/ML
25-50 INJECTION INTRAMUSCULAR; INTRAVENOUS
Status: DISCONTINUED | OUTPATIENT
Start: 2023-02-01 | End: 2023-02-02 | Stop reason: HOSPADM

## 2023-02-01 RX ORDER — METHYLPREDNISOLONE SODIUM SUCCINATE 125 MG/2ML
125 INJECTION, POWDER, LYOPHILIZED, FOR SOLUTION INTRAMUSCULAR; INTRAVENOUS
Status: DISCONTINUED | OUTPATIENT
Start: 2023-02-01 | End: 2023-02-02 | Stop reason: HOSPADM

## 2023-02-01 RX ORDER — METOPROLOL TARTRATE 1 MG/ML
5-15 INJECTION, SOLUTION INTRAVENOUS
Status: DISCONTINUED | OUTPATIENT
Start: 2023-02-01 | End: 2023-02-02 | Stop reason: HOSPADM

## 2023-02-01 RX ORDER — NITROGLYCERIN 0.4 MG/1
0.4 TABLET SUBLINGUAL
Status: DISCONTINUED | OUTPATIENT
Start: 2023-02-01 | End: 2023-02-02 | Stop reason: HOSPADM

## 2023-02-01 RX ORDER — DILTIAZEM HYDROCHLORIDE 5 MG/ML
10-15 INJECTION INTRAVENOUS
Status: DISCONTINUED | OUTPATIENT
Start: 2023-02-01 | End: 2023-02-02 | Stop reason: HOSPADM

## 2023-02-01 RX ORDER — DIPHENHYDRAMINE HCL 25 MG
25 CAPSULE ORAL
Status: DISCONTINUED | OUTPATIENT
Start: 2023-02-01 | End: 2023-02-02 | Stop reason: HOSPADM

## 2023-02-01 RX ORDER — IVABRADINE 5 MG/1
5-15 TABLET, FILM COATED ORAL
Status: COMPLETED | OUTPATIENT
Start: 2023-02-01 | End: 2023-02-01

## 2023-02-01 RX ADMIN — IVABRADINE 5 MG: 5 TABLET, FILM COATED ORAL at 11:10

## 2023-02-01 RX ADMIN — METOPROLOL TARTRATE 50 MG: 50 TABLET, FILM COATED ORAL at 11:09

## 2023-02-01 RX ADMIN — IOPAMIDOL 120 ML: 755 INJECTION, SOLUTION INTRAVENOUS at 12:41

## 2023-02-01 RX ADMIN — NITROGLYCERIN 0.4 MG: 0.4 TABLET SUBLINGUAL at 12:30

## 2023-02-12 ENCOUNTER — HEALTH MAINTENANCE LETTER (OUTPATIENT)
Age: 73
End: 2023-02-12

## 2023-04-19 ENCOUNTER — OFFICE VISIT (OUTPATIENT)
Dept: FAMILY MEDICINE | Facility: CLINIC | Age: 73
End: 2023-04-19
Payer: COMMERCIAL

## 2023-04-19 VITALS
BODY MASS INDEX: 35.79 KG/M2 | SYSTOLIC BLOOD PRESSURE: 136 MMHG | OXYGEN SATURATION: 97 % | TEMPERATURE: 97.8 F | WEIGHT: 202 LBS | HEIGHT: 63 IN | HEART RATE: 62 BPM | DIASTOLIC BLOOD PRESSURE: 84 MMHG | RESPIRATION RATE: 18 BRPM

## 2023-04-19 DIAGNOSIS — R73.01 IMPAIRED FASTING GLUCOSE: ICD-10-CM

## 2023-04-19 DIAGNOSIS — H26.9 CATARACT OF BOTH EYES, UNSPECIFIED CATARACT TYPE: ICD-10-CM

## 2023-04-19 DIAGNOSIS — Z01.818 PREOP GENERAL PHYSICAL EXAM: Primary | ICD-10-CM

## 2023-04-19 DIAGNOSIS — E66.01 MORBID OBESITY (H): ICD-10-CM

## 2023-04-19 LAB — HBA1C MFR BLD: 5.8 % (ref 0–5.6)

## 2023-04-19 PROCEDURE — 99214 OFFICE O/P EST MOD 30 MIN: CPT | Performed by: PHYSICIAN ASSISTANT

## 2023-04-19 PROCEDURE — 83036 HEMOGLOBIN GLYCOSYLATED A1C: CPT | Performed by: PHYSICIAN ASSISTANT

## 2023-04-19 PROCEDURE — 36415 COLL VENOUS BLD VENIPUNCTURE: CPT | Performed by: PHYSICIAN ASSISTANT

## 2023-04-19 ASSESSMENT — PAIN SCALES - GENERAL: PAINLEVEL: NO PAIN (0)

## 2023-04-19 NOTE — PATIENT INSTRUCTIONS
For informational purposes only. Not to replace the advice of your health care provider. Copyright   2003,  [facility]. All rights reserved. Clinically reviewed by Beulah Castillo MD. [employer] [id number] - REV .  Preparing for Your Surgery  Getting started  A nurse will call you to review your health history and instructions. They will give you an arrival time based on your scheduled surgery time. Please be ready to share:    Your doctor's clinic name and phone number    Your medical, surgical, and anesthesia history    A list of allergies and sensitivities    A list of medicines, including herbal treatments and over-the-counter drugs    Whether the patient has a legal guardian (ask how to send us the papers in advance)  Please tell us if you're pregnant--or if there's any chance you might be pregnant. Some surgeries may injure a fetus (unborn baby), so they require a pregnancy test. Surgeries that are safe for a fetus don't always need a test, and you can choose whether to have one.   If you have a child who's having surgery, please ask for a copy of Preparing for Your Child's Surgery.    Preparing for surgery    Within 10 to 30 days of surgery: Have a pre-op exam (sometimes called an H&P, or History and Physical). This can be done at a clinic or pre-operative center.  ? If you're having a , you may not need this exam. Talk to your care team.    At your pre-op exam, talk to your care team about all medicines you take. If you need to stop any medicines before surgery, ask when to start taking them again.  ? We do this for your safety. Many medicines can make you bleed too much during surgery. Some change how well surgery (anesthesia) drugs work.    Call your insurance company to let them know you're having surgery. (If you don't have insurance, call [phone].)    Call your clinic if there's any change in your health. This includes signs of a cold or flu (sore throat, runny nose,  cough, rash, fever). It also includes a scrape or scratch near the surgery site.    If you have questions on the day of surgery, call your hospital or surgery center.  Eating and drinking guidelines  For your safety: Unless your surgeon tells you otherwise, follow the guidelines below.    Eat and drink as usual until 8 hours before you arrive for surgery. After that, no food or milk.    Drink clear liquids until 2 hours before you arrive. These are liquids you can see through, like water, Gatorade, and Propel Water. They also include plain black coffee and tea (no cream or milk), candy, and breath mints. You can spit out gum when you arrive.    If you drink alcohol: Stop drinking it the night before surgery.    If your care team tells you to take medicine on the morning of surgery, it's okay to take it with a sip of water.  Preventing infection    Shower or bathe the night before and morning of your surgery. Follow the instructions your clinic gave you. (If no instructions, use regular soap.)    Don't shave or clip hair near your surgery site. We'll remove the hair if needed.    Don't smoke or vape the morning of surgery. You may chew nicotine gum up to 2 hours before surgery. A nicotine patch is okay.  ? Note: Some surgeries require you to completely quit smoking and nicotine. Check with your surgeon.    Your care team will make every effort to keep you safe from infection. We will:  ? Clean our hands often with soap and water (or an alcohol-based hand rub).  ? Clean the skin at your surgery site with a special soap that kills germs.  ? Give you a special gown to keep you warm. (Cold raises the risk of infection.)  ? Wear special hair covers, masks, gowns and gloves during surgery.  ? Give antibiotic medicine, if prescribed. Not all surgeries need antibiotics.  What to bring on the day of surgery    Photo ID and insurance card    Copy of your health care directive, if you have one    Glasses and hearing aids (bring  cases)  ? You can't wear contacts during surgery    Inhaler and eye drops, if you use them (tell us about these when you arrive)    CPAP machine or breathing device, if you use them    A few personal items, if spending the night    If you have . . .  ? A pacemaker, ICD (cardiac defibrillator) or other implant: Bring the ID card.  ? An implanted stimulator: Bring the remote control.  ? A legal guardian: Bring a copy of the certified (court-stamped) guardianship papers.  Please remove any jewelry, including body piercings. Leave jewelry and other valuables at home.  If you're going home the day of surgery    You must have a responsible adult drive you home. They should stay with you overnight as well.    If you don't have someone to stay with you, and you aren't safe to go home alone, we may keep you overnight. Insurance often won't pay for this.  After surgery  If it's hard to control your pain or you need more pain medicine, please call your surgeon's office.  Questions?   If you have any questions for your care team, list them here: _________________________________________________________________________________________________________________________________________________________________________ ____________________________________ ____________________________________ ____________________________________

## 2023-04-19 NOTE — PROGRESS NOTES
[facility] [place]  [address], [address]  [place] [address]  Phone: [phone]  Fax: [phone]  Primary Provider: No Ref-Primary, Physician  Pre-op Performing Provider: VY ONTIVEROS      PREOPERATIVE EVALUATION:  Today's date: 4/19/2023    Lisa Camarena is a 73 year old female who presents for a preoperative evaluation.      4/19/2023     7:00 AM   Additional Questions   Roomed by ve   Accompanied by self     Surgical Information:  Surgery/Procedure: cataract   Surgery Location: [facility]  Surgeon: Susanne?  Surgery Date: 5/8/2023  Time of Surgery: TBD  Where patient plans to recover: At home alone  Fax number for surgical facility: [phone]    Assessment & Plan     The proposed surgical procedure is considered LOW risk.    Preop general physical exam  Cataract of both eyes, unspecified cataract type  Pt does not have heart disease, arrhythmia history, diabetes on insulin, CKD or PVD.   Pt can exercise >4 METs , no CV sx at rest or with exertion.  Chronic conditions are stable  Surgery as scheduled.   Labs ordered below or as indicated.   Reviewed preop info in AVS with pt including NPO, showering, medication recommendations, indications to delay/schedule (ie new illness s/sx). Pt questions answered.    Impaired fasting glucose  Update today.   Continue healthy walking/exercise habits and healthy eating.   Follow up with PCP for annual exam   - Hemoglobin A1c; Future  - Hemoglobin A1c    Morbid obesity (H)  Advised continued efforts at regular exercise, active lifestyle, and healthy eating for long term weight management.   Weight loss would improve chronic conditions including: prediabetes, lipids  Specific discussion including: increasing fruits/vegetables, portion control, avoiding pop/juice/sweetened beverages, limiting alcohol if consuming regularly       Risks and Recommendations:  The patient has the following additional risks and  recommendations for perioperative complications:   - No identified additional risk factors other than previously addressed    Medication Instructions:  Patient is on no chronic medications    RECOMMENDATION:  APPROVAL GIVEN to proceed with proposed procedure, without further diagnostic evaluation.    Jacqueline Fung PA-C      Subjective     HPI related to upcoming procedure: bilateral cataract surgery         4/17/2023     1:20 PM   Preop Questions   1. Have you ever had a heart attack or stroke? No   2. Have you ever had surgery on your heart or blood vessels, such as a stent placement, a coronary artery bypass, or surgery on an artery in your head, neck, heart, or legs? No   3. Do you have chest pain with activity? No   4. Do you have a history of  heart failure? No   5. Do you currently have a cold, bronchitis or symptoms of other infection? No   6. Do you have a cough, shortness of breath, or wheezing? No   7. Do you or anyone in your family have previous history of blood clots? No   8. Do you or does anyone in your family have a serious bleeding problem such as prolonged bleeding following surgeries or cuts? No   9. Have you ever had problems with anemia or been told to take iron pills? YES - during pregnancy.    10. Have you had any abnormal blood loss such as black, tarry or bloody stools, or abnormal vaginal bleeding? No   11. Have you ever had a blood transfusion? No   12. Are you willing to have a blood transfusion if it is medically needed before, during, or after your surgery? Yes   13. Have you or any of your relatives ever had problems with anesthesia? No   14. Do you have sleep apnea, excessive snoring or daytime drowsiness? No   15. Do you have any artifical heart valves or other implanted medical devices like a pacemaker, defibrillator, or continuous glucose monitor? No   16. Do you have artificial joints? No   17. Are you allergic to latex? No       Health Care Directive:  Patient does not have  a Health Care Directive or Living Will: Discussed advance care planning with patient; however, patient declined at this time.    Preoperative Review of :   reviewed - no record of controlled substances prescribed.    Status of Chronic Conditions:  See problem list for active medical problems.  Problems all longstanding and stable, except as noted/documented.  See ROS for pertinent symptoms related to these conditions.    Elevated cholesterol not on statin     For exercise: walks 2-3miles most days, up to 5 miles. Line dancing. Feels good with exercise. Limitations with exercise due to: nothing. Denies CV sxs with exercise including CP, SOB, palpitations, QUIROZ, presyncope.        Review of Systems  CONSTITUTIONAL: NEGATIVE for fever, chills, change in weight  INTEGUMENTARY/SKIN: NEGATIVE for worrisome rashes, moles or lesions  EYES: vision changes due to cataracts  ENT/MOUTH: NEGATIVE for ear, mouth and throat problems  RESP: NEGATIVE for significant cough or SOB  CV: NEGATIVE for chest pain, palpitations or peripheral edema  GI: NEGATIVE for nausea, abdominal pain, heartburn, or change in bowel habits  GI: POS for heartburn takes omeprazole occasionally   : NEGATIVE for frequency, dysuria, or hematuria  MUSCULOSKELETAL: NEGATIVE for significant arthralgias or myalgia  NEURO: NEGATIVE for weakness, dizziness or paresthesias  ENDOCRINE: NEGATIVE for temperature intolerance, skin/hair changes  HEME: NEGATIVE for bleeding problems  PSYCHIATRIC: NEGATIVE for changes in mood or affect    Patient Active Problem List    Diagnosis Date Noted     Morbid obesity (H) 10/26/2022     Priority: Medium     Hyperlipidemia 08/01/2014     Priority: Medium     Formatting of this note might be different from the original.  , 163       Impaired fasting glucose 08/01/2014     Priority: Medium     Formatting of this note might be different from the original.   in 2007, 118 in 2010.       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  ICD 10       Obesity 10/30/2007     Priority: Medium      No past medical history on file.  Past Surgical History:   Procedure Laterality Date     GYN SURGERY  ?     Current Outpatient Medications   Medication Sig Dispense Refill     Calcium Acetate, Phos Binder, (CALCIUM ACETATE PO)        omeprazole (PRILOSEC) 20 MG DR capsule Take 20 mg by mouth as needed       vitamin B complex with vitamin C (STRESS TAB) tablet Take 1 tablet by mouth daily         Allergies   Allergen Reactions     Sulfamethoxazole-Trimethoprim GI Disturbance     Abdominal cramping        Social History     Tobacco Use     Smoking status: Former     Packs/day: 0.50     Years: 43.00     Pack years: 21.50     Types: Cigarettes     Start date: 1967     Quit date: 2010     Years since quittin.3     Smokeless tobacco: Never   Vaping Use     Vaping status: Never Used   Substance Use Topics     Alcohol use: Yes     Family History   Problem Relation Age of Onset     Other - See Comments Mother          covid complications     Heart Disease Father 59     Breast Cancer Daughter      History   Drug Use Unknown         Objective     /84 (BP Location: Left arm, Patient Position: Chair, Cuff Size: Adult Regular)   Pulse 62   Temp 97.8  F (36.6  C) (Temporal)   Resp 18   Ht 1.594 m (5' 2.75\")   Wt 91.6 kg (202 lb)   LMP  (Exact Date)   SpO2 97%   Breastfeeding No   BMI 36.07 kg/m      Physical Exam    GENERAL APPEARANCE: healthy, alert and no distress     EYES: EOMI, PERRL     HENT: ear canals and TM's normal and nose and mouth without ulcers or lesions     NECK: no adenopathy, no asymmetry, masses, or scars and thyroid normal to palpation     RESP: lungs clear to auscultation - no rales, rhonchi or wheezes     CV: regular rates and rhythm, normal S1 S2, no S3 or S4 and no " murmur, click or rub     ABDOMEN:  soft, nontender, no HSM or masses and bowel sounds normal     MS: extremities normal- no gross deformities noted, no evidence of inflammation in joints, FROM in all extremities.     SKIN: no suspicious lesions or rashes     NEURO: Normal strength and tone, sensory exam grossly normal, mentation intact and speech normal     PSYCH: mentation appears normal. and affect normal/bright     LYMPHATICS: No cervical adenopathy    Recent Labs   Lab Test 02/01/23  1119 11/09/22  1206 10/26/22  1423   HGB  --  13.2  --    PLT  --  279  --    NA  --  140 139   POTASSIUM  --  4.2 4.0   CR 0.7 0.63 0.70   A1C  --   --  5.7*        Diagnostics:  No results found for this or any previous visit (from the past 24 hour(s)).   No EKG required for low risk surgery (cataract, skin procedure, breast biopsy, etc).    Pt had a CTA Angiogram coronary artery 02/01/2023 and was cleared by cardiology after an abnormal cardiac stress test:  IMPRESSION:     1. Normal coronary anatomy with no detectable stenosis or plaque.     2.   Please review Radiology report for incidental noncardiac findings  that  will follow separately.         Revised Cardiac Risk Index (RCRI):  The patient has the following serious cardiovascular risks for perioperative complications:   - No serious cardiac risks = 0 points     RCRI Interpretation: 0 points: Class I (very low risk - 0.4% complication rate)           Signed Electronically by: Jacqueline Fung PA-C  Copy of this evaluation report is provided to requesting physician.

## 2023-10-20 ASSESSMENT — ENCOUNTER SYMPTOMS
DIZZINESS: 0
DYSURIA: 0
HEMATURIA: 0
ARTHRALGIAS: 0
NERVOUS/ANXIOUS: 0
CONSTIPATION: 0
JOINT SWELLING: 0
MYALGIAS: 0
FEVER: 0
HEADACHES: 0
SHORTNESS OF BREATH: 0
COUGH: 0
DIARRHEA: 0
WEAKNESS: 0
PALPITATIONS: 0
SORE THROAT: 0
HEMATOCHEZIA: 0
NAUSEA: 0
ABDOMINAL PAIN: 0
FREQUENCY: 0
BREAST MASS: 0
HEARTBURN: 1
EYE PAIN: 0
PARESTHESIAS: 0
CHILLS: 0

## 2023-10-20 ASSESSMENT — ACTIVITIES OF DAILY LIVING (ADL): CURRENT_FUNCTION: NO ASSISTANCE NEEDED

## 2023-10-27 ENCOUNTER — OFFICE VISIT (OUTPATIENT)
Dept: FAMILY MEDICINE | Facility: OTHER | Age: 73
End: 2023-10-27
Payer: COMMERCIAL

## 2023-10-27 VITALS
OXYGEN SATURATION: 97 % | BODY MASS INDEX: 37.92 KG/M2 | SYSTOLIC BLOOD PRESSURE: 124 MMHG | RESPIRATION RATE: 16 BRPM | WEIGHT: 214 LBS | HEART RATE: 52 BPM | TEMPERATURE: 97.7 F | HEIGHT: 63 IN | DIASTOLIC BLOOD PRESSURE: 72 MMHG

## 2023-10-27 DIAGNOSIS — Z13.29 SCREENING FOR THYROID DISORDER: ICD-10-CM

## 2023-10-27 DIAGNOSIS — Z13.820 ENCOUNTER FOR OSTEOPOROSIS SCREENING IN ASYMPTOMATIC POSTMENOPAUSAL PATIENT: ICD-10-CM

## 2023-10-27 DIAGNOSIS — Z12.31 ENCOUNTER FOR SCREENING MAMMOGRAM FOR BREAST CANCER: ICD-10-CM

## 2023-10-27 DIAGNOSIS — E78.5 HYPERLIPIDEMIA, UNSPECIFIED HYPERLIPIDEMIA TYPE: ICD-10-CM

## 2023-10-27 DIAGNOSIS — Z00.00 ENCOUNTER FOR MEDICARE ANNUAL WELLNESS EXAM: Primary | ICD-10-CM

## 2023-10-27 DIAGNOSIS — Z78.0 ENCOUNTER FOR OSTEOPOROSIS SCREENING IN ASYMPTOMATIC POSTMENOPAUSAL PATIENT: ICD-10-CM

## 2023-10-27 DIAGNOSIS — R73.03 PREDIABETES: ICD-10-CM

## 2023-10-27 DIAGNOSIS — Z11.59 NEED FOR HEPATITIS C SCREENING TEST: ICD-10-CM

## 2023-10-27 DIAGNOSIS — Z12.11 SPECIAL SCREENING FOR MALIGNANT NEOPLASMS, COLON: ICD-10-CM

## 2023-10-27 LAB
ALBUMIN SERPL BCG-MCNC: 4.3 G/DL (ref 3.5–5.2)
ALP SERPL-CCNC: 77 U/L (ref 35–104)
ALT SERPL W P-5'-P-CCNC: 20 U/L (ref 0–50)
ANION GAP SERPL CALCULATED.3IONS-SCNC: 10 MMOL/L (ref 7–15)
AST SERPL W P-5'-P-CCNC: 25 U/L (ref 0–45)
BILIRUB SERPL-MCNC: 0.5 MG/DL
BUN SERPL-MCNC: 8.9 MG/DL (ref 8–23)
CALCIUM SERPL-MCNC: 9.2 MG/DL (ref 8.8–10.2)
CHLORIDE SERPL-SCNC: 106 MMOL/L (ref 98–107)
CHOLEST SERPL-MCNC: 254 MG/DL
CREAT SERPL-MCNC: 0.81 MG/DL (ref 0.51–0.95)
DEPRECATED HCO3 PLAS-SCNC: 25 MMOL/L (ref 22–29)
EGFRCR SERPLBLD CKD-EPI 2021: 76 ML/MIN/1.73M2
GLUCOSE SERPL-MCNC: 113 MG/DL (ref 70–99)
HBA1C MFR BLD: 5.8 % (ref 0–5.6)
HCV AB SERPL QL IA: NONREACTIVE
HDLC SERPL-MCNC: 63 MG/DL
LDLC SERPL CALC-MCNC: 168 MG/DL
NONHDLC SERPL-MCNC: 191 MG/DL
POTASSIUM SERPL-SCNC: 4.1 MMOL/L (ref 3.4–5.3)
PROT SERPL-MCNC: 6.6 G/DL (ref 6.4–8.3)
SODIUM SERPL-SCNC: 141 MMOL/L (ref 135–145)
TRIGL SERPL-MCNC: 113 MG/DL
TSH SERPL DL<=0.005 MIU/L-ACNC: 3.85 UIU/ML (ref 0.3–4.2)

## 2023-10-27 PROCEDURE — 86803 HEPATITIS C AB TEST: CPT | Performed by: NURSE PRACTITIONER

## 2023-10-27 PROCEDURE — 36415 COLL VENOUS BLD VENIPUNCTURE: CPT | Performed by: NURSE PRACTITIONER

## 2023-10-27 PROCEDURE — G0439 PPPS, SUBSEQ VISIT: HCPCS | Performed by: NURSE PRACTITIONER

## 2023-10-27 PROCEDURE — 83036 HEMOGLOBIN GLYCOSYLATED A1C: CPT | Performed by: NURSE PRACTITIONER

## 2023-10-27 PROCEDURE — 80053 COMPREHEN METABOLIC PANEL: CPT | Performed by: NURSE PRACTITIONER

## 2023-10-27 PROCEDURE — 99213 OFFICE O/P EST LOW 20 MIN: CPT | Mod: 25 | Performed by: NURSE PRACTITIONER

## 2023-10-27 PROCEDURE — 80061 LIPID PANEL: CPT | Performed by: NURSE PRACTITIONER

## 2023-10-27 PROCEDURE — 84443 ASSAY THYROID STIM HORMONE: CPT | Performed by: NURSE PRACTITIONER

## 2023-10-27 RX ORDER — RESPIRATORY SYNCYTIAL VIRUS VACCINE 120MCG/0.5
0.5 KIT INTRAMUSCULAR ONCE
Qty: 1 EACH | Refills: 0 | Status: CANCELLED | OUTPATIENT
Start: 2023-10-27 | End: 2023-10-27

## 2023-10-27 ASSESSMENT — ENCOUNTER SYMPTOMS
WEAKNESS: 0
HEMATOCHEZIA: 0
ARTHRALGIAS: 0
DIZZINESS: 0
NAUSEA: 0
DYSURIA: 0
ABDOMINAL PAIN: 0
HEARTBURN: 1
HEMATURIA: 0
COUGH: 0
DIARRHEA: 0
FREQUENCY: 0
PALPITATIONS: 0
MYALGIAS: 0
BREAST MASS: 0
CONSTIPATION: 0
EYE PAIN: 0
HEADACHES: 0
JOINT SWELLING: 0
PARESTHESIAS: 0
SHORTNESS OF BREATH: 0
SORE THROAT: 0
CHILLS: 0
NERVOUS/ANXIOUS: 0
FEVER: 0

## 2023-10-27 ASSESSMENT — ACTIVITIES OF DAILY LIVING (ADL): CURRENT_FUNCTION: NO ASSISTANCE NEEDED

## 2023-10-27 ASSESSMENT — PAIN SCALES - GENERAL: PAINLEVEL: NO PAIN (0)

## 2023-10-27 NOTE — PROGRESS NOTES
"SUBJECTIVE:   Lisa is a 73 year old who presents for Preventive Visit.        Healthy Habits:     In general, how would you rate your overall health?  Good    Frequency of exercise:  2-3 days/week    Duration of exercise:  30-45 minutes    Do you usually eat at least 4 servings of fruit and vegetables a day, include whole grains    & fiber and avoid regularly eating high fat or \"junk\" foods?  No    Taking medications regularly:  Yes    Medication side effects:  Not applicable    Ability to successfully perform activities of daily living:  No assistance needed    Home Safety:  No safety concerns identified    Hearing Impairment:  No hearing concerns    In the past 6 months, have you been bothered by leaking of urine?  No    In general, how would you rate your overall mental or emotional health?  Good    Additional concerns today:  No      Today's PHQ-2 Score:       10/26/2023    12:51 PM   PHQ-2 ( 1999 [employer])   Q1: Little interest or pleasure in doing things 0   Q2: Feeling down, depressed or hopeless 0   PHQ-2 Score 0   Q1: Little interest or pleasure in doing things Not at all   Q2: Feeling down, depressed or hopeless Not at all   PHQ-2 Score 0           Have you ever done Advance Care Planning? (For example, a Health Directive, POLST, or a discussion with a medical provider or your loved ones about your wishes): No, advance care planning information given to patient to review.  Patient declined advance care planning discussion at this time.  =  Fall risk  Fallen 2 or more times in the past year?: No  Any fall with injury in the past year?: No    Cognitive Screening   1) Repeat 3 items (Leader, Season, Table)    2) Clock draw: NORMAL  3) 3 item recall: Recalls 3 objects  Results: 3 items recalled: COGNITIVE IMPAIRMENT LESS LIKELY    Mini-CogTM Copyright S [employer]. Licensed by the author for use in [facility]; reprinted with permission (juan [email or web address]). All rights reserved.      Do you have sleep " apnea, excessive snoring or daytime drowsiness? : no    Reviewed and updated as needed this visit by clinical staff   Tobacco  Allergies  Meds              Reviewed and updated as needed this visit by Provider                 Social History     Tobacco Use    Smoking status: Former     Packs/day: 0.50     Years: 43.00     Additional pack years: 0.00     Total pack years: 21.50     Types: Cigarettes     Start date: 1967     Quit date: 2010     Years since quittin.8    Smokeless tobacco: Never   Substance Use Topics    Alcohol use: Yes     Alcohol/week: 3.0 standard drinks of alcohol     Types: 3 Standard drinks or equivalent per week             10/20/2023     9:49 AM   Alcohol Use   Prescreen: >3 drinks/day or >7 drinks/week? No     Do you have a current opioid prescription? No  Do you use any other controlled substances or medications that are not prescribed by a provider? None  =  =    Current providers sharing in care for this patient include:     Patient Care Team:  No Ref-Primary, Physician as PCP - General  System, Provider Not In (Clinic)  Jena Ferreria APRN CNP as Assigned PCP  Sandra Presley MD as Assigned Heart and Vascular Provider    The following health maintenance items are reviewed in Epic and correct as of today:  Health Maintenance   Topic Date Due    COVID-19 Vaccine (1) Never done    HEPATITIS C SCREENING  Never done    ZOSTER IMMUNIZATION (1 of 2) Never done    LUNG CANCER SCREENING  Never done    RSV VACCINE 60+ (1 - 1-dose 60+ series) Never done    Pneumococcal Vaccine: 65+ Years (1 - PCV) Never done    DEXA  2022    INFLUENZA VACCINE (1) Never done    COLORECTAL CANCER SCREENING  10/26/2023    MAMMO SCREENING  2024    MEDICARE ANNUAL WELLNESS VISIT  10/27/2024    ANNUAL REVIEW OF HM ORDERS  10/27/2024    FALL RISK ASSESSMENT  10/27/2024    DTAP/TDAP/TD IMMUNIZATION (3 - Td or Tdap) 2027    LIPID  10/26/2027    ADVANCE CARE PLANNING  10/26/2027     "PHQ-2 (once per calendar year)  Completed    IPV IMMUNIZATION  Aged Out    HPV IMMUNIZATION  Aged Out    MENINGITIS IMMUNIZATION  Aged Out         Review of Systems   Constitutional:  Negative for chills and fever.   HENT:  Negative for congestion, ear pain, hearing loss and sore throat.    Eyes:  Negative for pain and visual disturbance.   Respiratory:  Negative for cough and shortness of breath.    Cardiovascular:  Negative for chest pain, palpitations and peripheral edema.   Gastrointestinal:  Positive for heartburn. Negative for abdominal pain, constipation, diarrhea, hematochezia and nausea.   Breasts:  Negative for tenderness, breast mass and discharge.   Genitourinary:  Positive for urgency. Negative for dysuria, frequency, genital sores, hematuria, pelvic pain, vaginal bleeding and vaginal discharge.   Musculoskeletal:  Negative for arthralgias, joint swelling and myalgias.   Skin:  Negative for rash.   Neurological:  Negative for dizziness, weakness, headaches and paresthesias.   Psychiatric/Behavioral:  Negative for mood changes. The patient is not nervous/anxious.        OBJECTIVE:   /72   Pulse 52   Temp 97.7  F (36.5  C) (Temporal)   Resp 16   Ht 1.588 m (5' 2.5\")   Wt 97.1 kg (214 lb)   SpO2 97%   BMI 38.52 kg/m   Estimated body mass index is 38.52 kg/m  as calculated from the following:    Height as of this encounter: 1.588 m (5' 2.5\").    Weight as of this encounter: 97.1 kg (214 lb).  Physical Exam  GENERAL: healthy, alert and no distress  EYES: Eyes grossly normal to inspection, PERRL and conjunctivae and sclerae normal  HENT: ear canals and TM's normal, nose and mouth without ulcers or lesions  NECK: no adenopathy, no asymmetry, masses, or scars and thyroid normal to palpation  RESP: lungs clear to auscultation - no rales, rhonchi or wheezes  CV: regular rate and rhythm, normal S1 S2, no S3 or S4, no murmur, click or rub, no peripheral edema and peripheral pulses strong  SKIN: no " suspicious lesions or rashes  NEURO: Normal strength and tone, mentation intact and speech normal  PSYCH: mentation appears normal, affect normal/bright    Diagnostic Test Results:  Labs reviewed in Epic  Results for orders placed or performed in visit on 10/27/23   Hemoglobin A1c     Status: Abnormal   Result Value Ref Range    Hemoglobin A1C 5.8 (H) 0.0 - 5.6 %       ASSESSMENT / PLAN:   (Z00.00) Encounter for Medicare annual wellness exam  (primary encounter diagnosis)  Comment:   Plan: Updated      (R73.03) Prediabetes  Comment:   Plan: Hemoglobin A1c, Comprehensive metabolic panel         (BMP + Alb, Alk Phos, ALT, AST, Total. Bili,         TP)        Monitor and encourage lifestyle changes with diet and exercise.   Level 3    (E78.5) Hyperlipidemia, unspecified hyperlipidemia type  Comment:   Plan: Lipid panel reflex to direct LDL Fasting,         Comprehensive metabolic panel (BMP + Alb, Alk         Phos, ALT, AST, Total. Bili, TP)    Labs today, continues to decline statin therapy.   Visited with cardiology last January for abnormal stress test, had CTA which was normal without any stenosis or plaque.    Level 3    (Z13.29) Screening for thyroid disorder  Comment:   Plan: TSH with free T4 reflex            (Z12.31) Encounter for screening mammogram for breast cancer  Comment:   Plan: MA Screening Digital Bilateral            (Z13.820,  Z78.0) Encounter for osteoporosis screening in asymptomatic postmenopausal patient  Comment:   Plan: DEXA HIP/PELVIS/SPINE - Future            (Z11.59) Need for hepatitis C screening test  Comment:   Plan: Hepatitis C Screen Reflex to HCV RNA Quant and         Genotype            (Z12.11) Special screening for malignant neoplasms, colon  Comment:   Plan: Fecal colorectal cancer screen (FIT)            Patient has been advised of split billing requirements and indicates understanding: Yes      COUNSELING:  Reviewed preventive health counseling, as reflected in patient  "instructions      BMI:   Estimated body mass index is 38.52 kg/m  as calculated from the following:    Height as of this encounter: 1.588 m (5' 2.5\").    Weight as of this encounter: 97.1 kg (214 lb).   Weight management plan: Discussed healthy diet and exercise guidelines      She reports that she quit smoking about 13 years ago. Her smoking use included cigarettes. She started smoking about 56 years ago. She has a 21.50 pack-year smoking history. She has never used smokeless tobacco.      Appropriate preventive services were discussed with this patient, including applicable screening as appropriate for fall prevention, nutrition, physical activity, Tobacco-use cessation, weight loss and cognition.  Checklist reviewing preventive services available has been given to the patient.    Reviewed patients plan of care and provided an AVS. The Intermediate Care Plan ( asthma action plan, low back pain action plan, and migraine action plan) for Lisa meets the Care Plan requirement. This Care Plan has been established and reviewed with the Patient.          ANJANA Carrero CNP  [facility]    Identified Health Risks:  I have reviewed Opioid Use Disorder and Substance Use Disorder risk factors and made any needed referrals.   "

## 2023-10-27 NOTE — PATIENT INSTRUCTIONS
Patient Education   Personalized Prevention Plan  You are due for the preventive services outlined below.  Your care team is available to assist you in scheduling these services.  If you have already completed any of these items, please share that information with your care team to update in your medical record.  Health Maintenance Due   Topic Date Due     COVID-19 Vaccine (1) Never done     Hepatitis C Screening  Never done     Zoster (Shingles) Vaccine (1 of 2) Never done     LUNG CANCER SCREENING  Never done     RSV VACCINE 60+ (1 - 1-dose 60+ series) Never done     Pneumococcal Vaccine (1 - PCV) Never done     Osteoporosis Screening  11/07/2022     Flu Vaccine (1) Never done     ANNUAL REVIEW OF HM ORDERS  10/26/2023     Colorectal Cancer Screening  10/26/2023

## 2023-11-02 ENCOUNTER — HOSPITAL ENCOUNTER (OUTPATIENT)
Dept: MAMMOGRAPHY | Facility: CLINIC | Age: 73
Discharge: HOME | End: 2023-11-02
Attending: NURSE PRACTITIONER
Payer: COMMERCIAL

## 2023-11-02 ENCOUNTER — HOSPITAL ENCOUNTER (OUTPATIENT)
Dept: BONE DENSITY | Facility: CLINIC | Age: 73
Discharge: HOME | End: 2023-11-02
Attending: NURSE PRACTITIONER
Payer: COMMERCIAL

## 2023-11-02 DIAGNOSIS — Z13.820 ENCOUNTER FOR OSTEOPOROSIS SCREENING IN ASYMPTOMATIC POSTMENOPAUSAL PATIENT: ICD-10-CM

## 2023-11-02 DIAGNOSIS — Z78.0 ENCOUNTER FOR OSTEOPOROSIS SCREENING IN ASYMPTOMATIC POSTMENOPAUSAL PATIENT: ICD-10-CM

## 2023-11-02 DIAGNOSIS — Z12.31 ENCOUNTER FOR SCREENING MAMMOGRAM FOR BREAST CANCER: ICD-10-CM

## 2023-11-02 PROCEDURE — 77067 SCR MAMMO BI INCL CAD: CPT

## 2023-11-02 PROCEDURE — 77080 DXA BONE DENSITY AXIAL: CPT

## 2024-10-25 SDOH — HEALTH STABILITY: PHYSICAL HEALTH: ON AVERAGE, HOW MANY DAYS PER WEEK DO YOU ENGAGE IN MODERATE TO STRENUOUS EXERCISE (LIKE A BRISK WALK)?: 3 DAYS

## 2024-10-25 SDOH — HEALTH STABILITY: PHYSICAL HEALTH: ON AVERAGE, HOW MANY MINUTES DO YOU ENGAGE IN EXERCISE AT THIS LEVEL?: 60 MIN

## 2024-10-25 ASSESSMENT — SOCIAL DETERMINANTS OF HEALTH (SDOH): HOW OFTEN DO YOU GET TOGETHER WITH FRIENDS OR RELATIVES?: TWICE A WEEK

## 2024-10-28 ENCOUNTER — OFFICE VISIT (OUTPATIENT)
Dept: FAMILY MEDICINE | Facility: OTHER | Age: 74
End: 2024-10-28
Payer: COMMERCIAL

## 2024-10-28 VITALS
WEIGHT: 218.5 LBS | RESPIRATION RATE: 9 BRPM | TEMPERATURE: 97.7 F | HEART RATE: 58 BPM | SYSTOLIC BLOOD PRESSURE: 120 MMHG | BODY MASS INDEX: 40.21 KG/M2 | HEIGHT: 62 IN | DIASTOLIC BLOOD PRESSURE: 76 MMHG | OXYGEN SATURATION: 97 %

## 2024-10-28 DIAGNOSIS — Z13.29 SCREENING FOR THYROID DISORDER: ICD-10-CM

## 2024-10-28 DIAGNOSIS — E66.01 MORBID OBESITY (H): ICD-10-CM

## 2024-10-28 DIAGNOSIS — R73.01 IMPAIRED FASTING GLUCOSE: ICD-10-CM

## 2024-10-28 DIAGNOSIS — E78.5 HYPERLIPIDEMIA, UNSPECIFIED HYPERLIPIDEMIA TYPE: ICD-10-CM

## 2024-10-28 DIAGNOSIS — Z12.11 SPECIAL SCREENING FOR MALIGNANT NEOPLASMS, COLON: ICD-10-CM

## 2024-10-28 DIAGNOSIS — Z00.00 ENCOUNTER FOR MEDICARE ANNUAL WELLNESS EXAM: Primary | ICD-10-CM

## 2024-10-28 DIAGNOSIS — F33.8 SEASONAL AFFECTIVE DISORDER (H): ICD-10-CM

## 2024-10-28 LAB
ALBUMIN SERPL BCG-MCNC: 4.3 G/DL (ref 3.5–5.2)
ALP SERPL-CCNC: 85 U/L (ref 40–150)
ALT SERPL W P-5'-P-CCNC: 28 U/L (ref 0–50)
ANION GAP SERPL CALCULATED.3IONS-SCNC: 14 MMOL/L (ref 7–15)
AST SERPL W P-5'-P-CCNC: 34 U/L (ref 0–45)
BILIRUB SERPL-MCNC: 0.5 MG/DL
BUN SERPL-MCNC: 9.6 MG/DL (ref 8–23)
CALCIUM SERPL-MCNC: 9.4 MG/DL (ref 8.8–10.4)
CHLORIDE SERPL-SCNC: 100 MMOL/L (ref 98–107)
CREAT SERPL-MCNC: 0.77 MG/DL (ref 0.51–0.95)
EGFRCR SERPLBLD CKD-EPI 2021: 80 ML/MIN/1.73M2
EST. AVERAGE GLUCOSE BLD GHB EST-MCNC: 120 MG/DL
GLUCOSE SERPL-MCNC: 109 MG/DL (ref 70–99)
HBA1C MFR BLD: 5.8 % (ref 0–5.6)
HCO3 SERPL-SCNC: 24 MMOL/L (ref 22–29)
POTASSIUM SERPL-SCNC: 4.7 MMOL/L (ref 3.4–5.3)
PROT SERPL-MCNC: 7.1 G/DL (ref 6.4–8.3)
SODIUM SERPL-SCNC: 138 MMOL/L (ref 135–145)
TSH SERPL DL<=0.005 MIU/L-ACNC: 2.04 UIU/ML (ref 0.3–4.2)

## 2024-10-28 PROCEDURE — 36415 COLL VENOUS BLD VENIPUNCTURE: CPT | Performed by: NURSE PRACTITIONER

## 2024-10-28 PROCEDURE — 83036 HEMOGLOBIN GLYCOSYLATED A1C: CPT | Performed by: NURSE PRACTITIONER

## 2024-10-28 PROCEDURE — 99214 OFFICE O/P EST MOD 30 MIN: CPT | Mod: 25 | Performed by: NURSE PRACTITIONER

## 2024-10-28 PROCEDURE — 80053 COMPREHEN METABOLIC PANEL: CPT | Performed by: NURSE PRACTITIONER

## 2024-10-28 PROCEDURE — G0439 PPPS, SUBSEQ VISIT: HCPCS | Performed by: NURSE PRACTITIONER

## 2024-10-28 PROCEDURE — 84443 ASSAY THYROID STIM HORMONE: CPT | Mod: GZ | Performed by: NURSE PRACTITIONER

## 2024-10-28 RX ORDER — CLOBETASOL PROPIONATE 0.5 MG/G
CREAM TOPICAL 2 TIMES DAILY
COMMUNITY
Start: 2024-07-09

## 2024-10-28 RX ORDER — HALOBETASOL PROPIONATE 0.05 %
OINTMENT (GRAM) TOPICAL 2 TIMES DAILY
COMMUNITY
Start: 2024-01-11

## 2024-10-28 RX ORDER — MOMETASONE FUROATE 1 MG/G
CREAM TOPICAL DAILY
COMMUNITY
Start: 2024-01-09

## 2024-10-28 ASSESSMENT — PAIN SCALES - GENERAL: PAINLEVEL_OUTOF10: MODERATE PAIN (4)

## 2024-10-28 ASSESSMENT — PATIENT HEALTH QUESTIONNAIRE - PHQ9: SUM OF ALL RESPONSES TO PHQ QUESTIONS 1-9: 11

## 2024-10-28 NOTE — PROGRESS NOTES
"Preventive Care Visit  [facility]  Oct 28, 2024      Assessment & Plan     (Z00.00) Encounter for Medicare annual wellness exam  (primary encounter diagnosis)  Comment: Reviewed HME. Patient did not want any vaccinations today.  Plan: Patient is receiving her colon screen via mail from her insurance. Checking CMP, A1C but patient did not want her lipid labs checked today since she does not want to take medications for this no matter the result.     The 10-year ASCVD risk score (Lola HAMLIN, et al., 2019) is: 14.6%    Values used to calculate the score:      Age: 74 years      Sex: Female      Is Non- : No      Diabetic: No      Tobacco smoker: No      Systolic Blood Pressure: 120 mmHg      Is BP treated: No      HDL Cholesterol: 63 mg/dL      Total Cholesterol: 254 mg/dL      (E66.01) Morbid obesity (H)  Comment: Pt concerned about her inability to loose weight. Inquired about weight loss medications. Pt states she is not eating well and rarely eats fruits and vegetables. She exercises once week by going line dancing.   Plan: Comprehensive metabolic panel (BMP + Alb, Alk         Phos, ALT, AST, Total. Bili, TP)        Discussed with patient the importance of increasing fruit and vegetable intake as well as increasing daily exercise such as walking. Patient was receptive of this. Had a discussion with patient about weight loss medication and how she would be need to be seen for a different appointment to extensively discuss what this would entail. Also recommended to her to check with her insurance if these drugs are covered. Drawing CMP and Hemoglobin A1 to help with future planning if she choose this route.            (F33.8) Seasonal affective disorder (H)  Comment: Patient states she gets very \"emotional\" in the fall and does not feel like herself. See HPI for more details. PHQ 9 score was 11 this visit, putting her in the " "moderate depression category. Suspicious of SAD since she says this those not happen all the time but does seem to repeat at this time of the year. Will also check a TSH to rule out any thyroid issues   Plan: Recommended patient to start Vitamin D supplementation, increasing her daily activity with walks outside and purchasing a light that can mimic sunlight. Discussed that medication are also an option but patient declines at this time.     (R73.01) Impaired fasting glucose  Comment: Pre-diabetic with her last check and given her concern of weight gain want to check if she has progressed to Type 2 Diabetes.   Plan: Hemoglobin A1c          (E78.5) Hyperlipidemia, unspecified hyperlipidemia type  Comment: Pt declined checking her hyperlipidemia labs since she does not want to take medication for this no matter the result.   Plan: No plans today to start any medications for this. Aware of cardiovascular risk factors. Lifestyle changes encouraged.     (Z13.29) Screening for thyroid disorder  Comment: see above   Plan: TSH with free T4 reflex      (Z12.11) Special screening for malignant neoplasms, colon  Comment: Part of E   Plan: Patient is receiving test via mail    Patient has been advised of split billing requirements and indicates understanding: Yes        BMI  Estimated body mass index is 39.8 kg/m  as calculated from the following:    Height as of this encounter: 1.578 m (5' 2.13\").    Weight as of this encounter: 99.1 kg (218 lb 8 oz).   Weight management plan: Discussed healthy diet and exercise guidelines    Counseling  Appropriate preventive services were addressed with this patient via screening, questionnaire, or discussion as appropriate for fall prevention, nutrition, physical activity, Tobacco-use cessation, social engagement, weight loss and cognition.  Checklist reviewing preventive services available has been given to the patient.  Reviewed patient's diet, addressing concerns and/or questions. " "  She is at risk for lack of exercise and has been provided with information to increase physical activity for the benefit of her well-being.   Discussed possible causes of fatigue. Information on urinary incontinence and treatment options given to patient.       See Patient Instructions    Subjective   Lisa is a 74 year old, presenting for the following:  Physical        10/28/2024     1:00 PM   Additional Questions   Roomed by Rona NAVARRO       Pt stated that she has not been feeling like herself. Has gained 10lbs or more and feels like nothing is working. Has tried keto as well as limiting her calories. She once only ate 600 calories a day for some time but stopped doing so since it made her lightheaded and she saw no results. Discussed with her how this is not enough calories to sustain herself throughout the day and how severe calorie restriction can actually hinder one's weight loss. She states her diet currently is \"bad\" and that she does not really eat fruits and vegetables. She mostly eats frozen dinners but tries to pick the healthy options.   She does line dancing every Tuesday. She used to take two different strength training classes leading up to her [place] trip back in March and was proud of herself because she was able to get up off the floor again by herself without the help of anyone.   She is interested in a weight loss clinic or help with her weight loss since she has witnessed how much it has helped her daughter and how much happier her daughter now seems.     She became tearful during this visit stating that she does not like fall since \"everything just dies in fall\". Upon further prompting patient shared that both her daughter and  were diagnosed with illness in fall and shortly after passed the following spring. She says she has been feeling sad, unmotivated and alone  which is just not like herself. And how this often happens during this time of the year.    Her only other " complaint was that her back is sore from yesterday's yardwork as well as lifting a nightstand by herself that she refurbished. Though she says these symptoms usually resolve themselves after a few days.     Health Care Directive  Patient does not have a Health Care Directive: Discussed advance care planning with patient; information given to patient to review.      10/25/2024   General Health   How would you rate your overall physical health? Good   Feel stress (tense, anxious, or unable to sleep) To some extent      (!) STRESS CONCERN      10/25/2024   Nutrition   Diet: Low salt    Low fat/cholesterol       Multiple values from one day are sorted in reverse-chronological order         10/25/2024   Exercise   Days per week of moderate/strenous exercise 3 days   Average minutes spent exercising at this level 60 min            10/25/2024   Social Factors   Frequency of gathering with friends or relatives Twice a week   Worry food won't last until get money to buy more No   Food not last or not have enough money for food? No   Do you have housing? (Housing is defined as stable permanent housing and does not include staying ouside in a car, in a tent, in an abandoned building, in an overnight shelter, or couch-surfing.) Yes   Are you worried about losing your housing? No   Lack of transportation? No   Unable to get utilities (heat,electricity)? No            10/25/2024   Fall Risk   Fallen 2 or more times in the past year? No     No    Trouble with walking or balance? No     No        Patient-reported    Multiple values from one day are sorted in reverse-chronological order          10/25/2024   Activities of Daily Living- Home Safety   Needs help with the following daily activites None of the above   Safety concerns in the home None of the above            10/25/2024   Dental   Dentist two times every year? Yes            10/25/2024   Hearing Screening   Hearing concerns? None of the above            10/25/2024    Driving Risk Screening   Patient/family members have concerns about driving No            10/25/2024   General Alertness/Fatigue Screening   Have you been more tired than usual lately? (!) YES            10/25/2024   Urinary Incontinence Screening   Bothered by leaking urine in past 6 months Yes            10/25/2024   TB Screening   Were you born outside of the [place]? No            Today's PHQ-2 Score:       10/28/2024    12:48 PM   PHQ-2 (  [employer])   Q1: Little interest or pleasure in doing things 1    Q2: Feeling down, depressed or hopeless 1    PHQ-2 Score 2    Q1: Little interest or pleasure in doing things Several days   Q2: Feeling down, depressed or hopeless Several days   PHQ-2 Score 2       Patient-reported           10/25/2024   Substance Use   Alcohol more than 3/day or more than 7/wk No   Do you have a current opioid prescription? No   How severe/bad is pain from 1 to 10? 2/10   Do you use any other substances recreationally? No        Social History     Tobacco Use    Smoking status: Former     Current packs/day: 0.00     Average packs/day: 0.5 packs/day for 43.0 years (21.5 ttl pk-yrs)     Types: Cigarettes     Start date: 1967     Quit date: 2010     Years since quittin.8     Passive exposure: Never    Smokeless tobacco: Never   Vaping Use    Vaping status: Never Used   Substance Use Topics    Alcohol use: Yes     Alcohol/week: 3.0 standard drinks of alcohol     Types: 3 Standard drinks or equivalent per week    Drug use: Never         2023   LAST FHS-7 RESULTS   1st degree relative breast or ovarian cancer Yes   Any relative bilateral breast cancer No   Any male have breast cancer No   Any ONE woman have BOTH breast AND ovarian cancer No   Any woman with breast cancer before 50yrs Yes   2 or more relatives with breast AND/OR ovarian cancer No   2 or more relatives with breast AND/OR bowel cancer No        Mammogram Screening - Mammogram every 1-2 years updated in Health  Maintenance based on mutual decision making    ASCVD Risk   The 10-year ASCVD risk score (Lola HAMLIN, et al., 2019) is: 14.6%    Values used to calculate the score:      Age: 74 years      Sex: Female      Is Non- : No      Diabetic: No      Tobacco smoker: No      Systolic Blood Pressure: 120 mmHg      Is BP treated: No      HDL Cholesterol: 63 mg/dL      Total Cholesterol: 254 mg/dL        Reviewed and updated as needed this visit by Provider                    No past medical history on file.  Past Surgical History:   Procedure Laterality Date    GYN SURGERY  1985?    tubali ligation     BP Readings from Last 3 Encounters:   10/28/24 120/76   10/27/23 124/72   04/19/23 136/84    Wt Readings from Last 3 Encounters:   10/28/24 99.1 kg (218 lb 8 oz)   10/27/23 97.1 kg (214 lb)   04/19/23 91.6 kg (202 lb)                  Current Outpatient Medications   Medication Sig Dispense Refill    clobetasol propionate (TEMOVATE) 0.05 % external cream Apply topically 2 times daily.      halobetasol (ULTRAVATE) 0.05 % ointment Apply topically 2 times daily.      mometasone (ELOCON) 0.1 % external cream Apply topically daily.      omeprazole (PRILOSEC) 20 MG DR capsule Take 20 mg by mouth as needed      vitamin B complex with vitamin C (STRESS TAB) tablet Take 1 tablet by mouth daily       Allergies   Allergen Reactions    Sulfamethoxazole-Trimethoprim GI Disturbance     Abdominal cramping     Current providers sharing in care for this patient include:  Patient Care Team:  Jena Ferreira APRN CNP as PCP - General (Family Medicine)  System, Provider Not In (Clinic)  Jena Ferreira APRN CNP as Assigned PCP    The following health maintenance items are reviewed in Epic and correct as of today:  Health Maintenance   Topic Date Due    ZOSTER IMMUNIZATION (1 of 2) Never done    LUNG CANCER SCREENING  Never done    Pneumococcal Vaccine: 65+ Years (1 of 1 - PCV) Never done    COLORECTAL CANCER  "SCREENING  08/29/2023    RSV VACCINE (1 - 1-dose 75+ series) Never done    INFLUENZA VACCINE (1) Never done    COVID-19 Vaccine (1 - 2024-25 season) Never done    LIPID  10/27/2024    ANNUAL REVIEW OF HM ORDERS  10/27/2024    MEDICARE ANNUAL WELLNESS VISIT  10/27/2024    FALL RISK ASSESSMENT  10/28/2025    GLUCOSE  10/27/2026    DTAP/TDAP/TD IMMUNIZATION (3 - Td or Tdap) 07/14/2027    ADVANCE CARE PLANNING  10/27/2028    DEXA  11/02/2038    HEPATITIS C SCREENING  Completed    PHQ-2 (once per calendar year)  Completed    HPV IMMUNIZATION  Aged Out    MENINGITIS IMMUNIZATION  Aged Out    RSV MONOCLONAL ANTIBODY  Aged Out    MAMMO SCREENING  Discontinued         Review of Systems  CONSTITUTIONAL: NEGATIVE for fever, chills, change in weight  INTEGUMENTARY/SKIN: NEGATIVE for worrisome rashes, moles or lesions. Positive for eczema  on face and extremities that is not new for patient.   EYES: NEGATIVE for vision changes or irritation  ENT/MOUTH: NEGATIVE for ear, mouth and throat problems  RESP: NEGATIVE for significant cough or SOB  BREAST: NEGATIVE for masses, tenderness or discharge  CV: NEGATIVE for chest pain, palpitations or peripheral edema  GI: NEGATIVE for nausea, abdominal pain, heartburn, or change in bowel habits  : NEGATIVE for frequency, dysuria, or hematuria  MUSCULOSKELETAL: Positive for back pain  NEURO: NEGATIVE for weakness, dizziness or paresthesias  ENDOCRINE: NEGATIVE for temperature intolerance, skin/hair changes  HEME: NEGATIVE for bleeding problems  PSYCHIATRIC: POSITIVE for depressed mood, feeling \"alone\", emotional and weight gain     Objective    Exam  /76 (Cuff Size: Adult Large)   Pulse 58   Temp 97.7  F (36.5  C)   Resp (!) 9   Ht 1.578 m (5' 2.13\")   Wt 99.1 kg (218 lb 8 oz)   SpO2 97%   BMI 39.80 kg/m     Estimated body mass index is 39.8 kg/m  as calculated from the following:    Height as of this encounter: 1.578 m (5' 2.13\").    Weight as of this encounter: 99.1 kg (218 " lb 8 oz).    Physical Exam  GENERAL: alert and no distress  EYES: Eyes grossly normal to inspection, PERRL and conjunctivae and sclerae normal  HENT: ear canals and TM's normal, nose and mouth without ulcers or lesions  NECK: no adenopathy, no asymmetry, masses, or scars  RESP: lungs clear to auscultation - no rales, rhonchi or wheezes  CV: regular rate and rhythm, normal S1 S2, no S3 or S4, no murmur, click or rub, no peripheral edema  ABDOMEN: soft, nontender, no hepatosplenomegaly, no masses and bowel sounds normal  MS: no gross musculoskeletal defects noted, no edema        10/28/2024   Mini Cog   Clock Draw Score 2 Normal   3 Item Recall 3 objects recalled   Mini Cog Total Score 5             Signed Electronically by: ANJANA Carrero CNP

## 2024-10-28 NOTE — PATIENT INSTRUCTIONS
Patient Education   Preventive Care Advice   This is general advice given by our system to help you stay healthy. However, your care team may have specific advice just for you. Please talk to your care team about your preventive care needs.  Nutrition  Eat 5 or more servings of fruits and vegetables each day.  Try wheat bread, brown rice and whole grain pasta (instead of white bread, rice, and pasta).  Get enough calcium and vitamin D. Check the label on foods and aim for 100% of the RDA (recommended daily allowance).  Lifestyle  Exercise at least 150 minutes each week  (30 minutes a day, 5 days a week).  Do muscle strengthening activities 2 days a week. These help control your weight and prevent disease.  No smoking.  Wear sunscreen to prevent skin cancer.  Have a dental exam and cleaning every 6 months.  Yearly exams  See your health care team every year to talk about:  Any changes in your health.  Any medicines your care team has prescribed.  Preventive care, family planning, and ways to prevent chronic diseases.  Shots (vaccines)   HPV shots (up to age 26), if you've never had them before.  Hepatitis B shots (up to age 59), if you've never had them before.  COVID-19 shot: Get this shot when it's due.  Flu shot: Get a flu shot every year.  Tetanus shot: Get a tetanus shot every 10 years.  Pneumococcal, hepatitis A, and RSV shots: Ask your care team if you need these based on your risk.  Shingles shot (for age 50 and up)  General health tests  Diabetes screening:  Starting at age 35, Get screened for diabetes at least every 3 years.  If you are younger than age 35, ask your care team if you should be screened for diabetes.  Cholesterol test: At age 39, start having a cholesterol test every 5 years, or more often if advised.  Bone density scan (DEXA): At age 50, ask your care team if you should have this scan for osteoporosis (brittle bones).  Hepatitis C: Get tested at least once in your life.  STIs (sexually  transmitted infections)  Before age 24: Ask your care team if you should be screened for STIs.  After age 24: Get screened for STIs if you're at risk. You are at risk for STIs (including HIV) if:  You are sexually active with more than one person.  You don't use condoms every time.  You or a partner was diagnosed with a sexually transmitted infection.  If you are at risk for HIV, ask about PrEP medicine to prevent HIV.  Get tested for HIV at least once in your life, whether you are at risk for HIV or not.  Cancer screening tests  Cervical cancer screening: If you have a cervix, begin getting regular cervical cancer screening tests starting at age 21.  Breast cancer scan (mammogram): If you've ever had breasts, begin having regular mammograms starting at age 40. This is a scan to check for breast cancer.  Colon cancer screening: It is important to start screening for colon cancer at age 45.  Have a colonoscopy test every 10 years (or more often if you're at risk) Or, ask your provider about stool tests like a FIT test every year or Cologuard test every 3 years.  To learn more about your testing options, visit:   .  For help making a decision, visit:   https://bit.ly/zp66701.  Prostate cancer screening test: If you have a prostate, ask your care team if a prostate cancer screening test (PSA) at age 55 is right for you.  Lung cancer screening: If you are a current or former smoker ages 50 to 80, ask your care team if ongoing lung cancer screenings are right for you.  For informational purposes only. Not to replace the advice of your health care provider. Copyright   2023 [facility]. All rights reserved. Clinically reviewed by the [facility]. [employer] [id number] - REV 01/24.  Learning About Sleeping Well  What does sleeping well mean?     Sleeping well means getting enough sleep to feel good and stay healthy. How much sleep is enough varies among people.  The number of hours you  sleep and how you feel when you wake up are both important. If you do not feel refreshed, you probably need more sleep. Another sign of not getting enough sleep is feeling tired during the day.  Experts recommend that adults get at least 7 or more hours of sleep per day. Children and older adults need more sleep.  Why is getting enough sleep important?  Getting enough quality sleep is a basic part of good health. When your sleep suffers, your physical health, mood, and your thoughts can suffer too. You may find yourself feeling more grumpy or stressed. Not getting enough sleep also can lead to serious problems, including injury, accidents, anxiety, and depression.  What might cause poor sleeping?  Many things can cause sleep problems, including:  Changes to your sleep schedule.  Stress. Stress can be caused by fear about a single event, such as giving a speech. Or you may have ongoing stress, such as worry about work or school.  Depression, anxiety, and other mental or emotional conditions.  Changes in your sleep habits or surroundings. This includes changes that happen where you sleep, such as noise, light, or sleeping in a different bed. It also includes changes in your sleep pattern, such as having jet lag or working a late shift.  Health problems, such as pain, breathing problems, and restless legs syndrome.  Lack of regular exercise.  Using alcohol, nicotine, or caffeine before bed.  How can you help yourself?  Here are some tips that may help you sleep more soundly and wake up feeling more refreshed.  Your sleeping area   Use your bedroom only for sleeping and sex. A bit of light reading may help you fall asleep. But if it doesn't, do your reading elsewhere in the house. Try not to use your TV, computer, smartphone, or tablet while you are in bed.  Be sure your bed is big enough to stretch out comfortably, especially if you have a sleep partner.  Keep your bedroom quiet, dark, and cool. Use curtains, blinds,  "or a sleep mask to block out light. To block out noise, use earplugs, soothing music, or a \"white noise\" machine.  Your evening and bedtime routine   Create a relaxing bedtime routine. You might want to take a warm shower or bath, or listen to soothing music.  Go to bed at the same time every night. And get up at the same time every morning, even if you feel tired.  What to avoid   Limit caffeine (coffee, tea, caffeinated sodas) during the day, and don't have any for at least 6 hours before bedtime.  Avoid drinking alcohol before bedtime. Alcohol can cause you to wake up more often during the night.  Try not to smoke or use tobacco, especially in the evening. Nicotine can keep you awake.  Limit naps during the day, especially close to bedtime.  Avoid lying in bed awake for too long. If you can't fall asleep or if you wake up in the middle of the night and can't get back to sleep within about 20 minutes, get out of bed and go to another room until you feel sleepy.  Avoid taking medicine right before bed that may keep you awake or make you feel hyper or energized. Your doctor can tell you if your medicine may do this and if you can take it earlier in the day.  If you can't sleep   Imagine yourself in a peaceful, pleasant scene. Focus on the details and feelings of being in a place that is relaxing.  Get up and do a quiet or boring activity until you feel sleepy.  Avoid drinking any liquids before going to bed to help prevent waking up often to use the bathroom.  Where can you learn more?  Go to https://www.[employer].net/patiented  Enter J942 in the search box to learn more about \"Learning About Sleeping Well.\"  Current as of: July 10, 2023  Content Version: 14.2 2024 [employer].   Care instructions adapted under license by your healthcare professional. If you have questions about a medical condition or this instruction, always ask your healthcare professional. [employer] disclaims any " warranty or liability for your use of this information.    Bladder Training: Care Instructions  Your Care Instructions     Bladder training is used to treat urge incontinence and stress incontinence. Urge incontinence means that the need to urinate comes on so fast that you can't get to a toilet in time. Stress incontinence means that you leak urine because of pressure on your bladder. For example, it may happen when you laugh, cough, or lift something heavy.  Bladder training can increase how long you can wait before you have to urinate. It can also help your bladder hold more urine. And it can give you better control over the urge to urinate.  It is important to remember that bladder training takes a few weeks to a few months to make a difference. You may not see results right away, but don't give up.  Follow-up care is a key part of your treatment and safety. Be sure to make and go to all appointments, and call your doctor if you are having problems. It's also a good idea to know your test results and keep a list of the medicines you take.  How can you care for yourself at home?  Work with your doctor to come up with a bladder training program that is right for you. You may use one or more of the following methods.  Delayed urination  In the beginning, try to keep from urinating for 5 minutes after you first feel the need to go.  While you wait, take deep, slow breaths to relax. Kegel exercises can also help you delay the need to go to the bathroom.  After some practice, when you can easily wait 5 minutes to urinate, try to wait 10 minutes before you urinate.  Slowly increase the waiting period until you are able to control when you have to urinate.  Scheduled urination  Empty your bladder when you first wake up in the morning.  Schedule times throughout the day when you will urinate.  Start by going to the bathroom every hour, even if you don't need to go.  Slowly increase the time between trips to the  "bathroom.  When you have found a schedule that works well for you, keep doing it.  If you wake up during the night and have to urinate, do it. Apply your schedule to waking hours only.  Kegel exercises  These tighten and strengthen pelvic muscles, which can help you control the flow of urine. (If doing these exercises causes pain, stop doing them and talk with your doctor.) To do Kegel exercises:  Squeeze your muscles as if you were trying not to pass gas. Or squeeze your muscles as if you were stopping the flow of urine. Your belly, legs, and buttocks shouldn't move.  Hold the squeeze for 3 seconds, then relax for 5 to 10 seconds.  Start with 3 seconds, then add 1 second each week until you are able to squeeze for 10 seconds.  Repeat the exercise 10 times a session. Do 3 to 8 sessions a day.  When should you call for help?  Watch closely for changes in your health, and be sure to contact your doctor if:    Your incontinence is getting worse.     You do not get better as expected.   Where can you learn more?  Go to https://www.[employer].net/patiented  Enter V684 in the search box to learn more about \"Bladder Training: Care Instructions.\"  Current as of: November 15, 2023  Content Version: 14.2 2024 [facility].   Care instructions adapted under license by your healthcare professional. If you have questions about a medical condition or this instruction, always ask your healthcare professional. [employer] disclaims any warranty or liability for your use of this information.       "

## 2024-10-29 ENCOUNTER — MYC MEDICAL ADVICE (OUTPATIENT)
Dept: FAMILY MEDICINE | Facility: OTHER | Age: 74
End: 2024-10-29
Payer: COMMERCIAL

## 2025-03-05 ENCOUNTER — OFFICE VISIT (OUTPATIENT)
Dept: FAMILY MEDICINE | Facility: OTHER | Age: 75
End: 2025-03-05
Payer: COMMERCIAL

## 2025-03-05 VITALS
SYSTOLIC BLOOD PRESSURE: 137 MMHG | RESPIRATION RATE: 10 BRPM | TEMPERATURE: 97.5 F | WEIGHT: 218.5 LBS | DIASTOLIC BLOOD PRESSURE: 81 MMHG | BODY MASS INDEX: 40.21 KG/M2 | HEIGHT: 62 IN | OXYGEN SATURATION: 98 % | HEART RATE: 63 BPM

## 2025-03-05 DIAGNOSIS — E66.01 MORBID OBESITY (H): ICD-10-CM

## 2025-03-05 DIAGNOSIS — Z23 NEED FOR SHINGLES VACCINE: ICD-10-CM

## 2025-03-05 DIAGNOSIS — Z12.11 SCREEN FOR COLON CANCER: ICD-10-CM

## 2025-03-05 DIAGNOSIS — E78.5 HYPERLIPIDEMIA, UNSPECIFIED HYPERLIPIDEMIA TYPE: ICD-10-CM

## 2025-03-05 DIAGNOSIS — Z01.818 PREOP GENERAL PHYSICAL EXAM: Primary | ICD-10-CM

## 2025-03-05 DIAGNOSIS — Z29.11 NEED FOR VACCINATION AGAINST RESPIRATORY SYNCYTIAL VIRUS: ICD-10-CM

## 2025-03-05 DIAGNOSIS — R63.4 ABNORMAL WEIGHT LOSS: ICD-10-CM

## 2025-03-05 DIAGNOSIS — R73.01 IMPAIRED FASTING GLUCOSE: ICD-10-CM

## 2025-03-05 DIAGNOSIS — H02.403 ACQUIRED BLEPHAROPTOSIS OF BOTH EYES: ICD-10-CM

## 2025-03-05 LAB
CHOLEST SERPL-MCNC: 228 MG/DL
FASTING STATUS PATIENT QL REPORTED: YES
HDLC SERPL-MCNC: 54 MG/DL
LDLC SERPL CALC-MCNC: 154 MG/DL
NONHDLC SERPL-MCNC: 174 MG/DL
TRIGL SERPL-MCNC: 101 MG/DL
TSH SERPL DL<=0.005 MIU/L-ACNC: 2.31 UIU/ML (ref 0.3–4.2)
VIT D+METAB SERPL-MCNC: 36 NG/ML (ref 20–50)

## 2025-03-05 PROCEDURE — 1125F AMNT PAIN NOTED PAIN PRSNT: CPT | Performed by: FAMILY MEDICINE

## 2025-03-05 PROCEDURE — 82306 VITAMIN D 25 HYDROXY: CPT | Performed by: FAMILY MEDICINE

## 2025-03-05 PROCEDURE — 36415 COLL VENOUS BLD VENIPUNCTURE: CPT | Performed by: FAMILY MEDICINE

## 2025-03-05 PROCEDURE — 3079F DIAST BP 80-89 MM HG: CPT | Performed by: FAMILY MEDICINE

## 2025-03-05 PROCEDURE — 3075F SYST BP GE 130 - 139MM HG: CPT | Performed by: FAMILY MEDICINE

## 2025-03-05 PROCEDURE — 93000 ELECTROCARDIOGRAM COMPLETE: CPT | Performed by: FAMILY MEDICINE

## 2025-03-05 PROCEDURE — 99214 OFFICE O/P EST MOD 30 MIN: CPT | Performed by: FAMILY MEDICINE

## 2025-03-05 PROCEDURE — 80061 LIPID PANEL: CPT | Performed by: FAMILY MEDICINE

## 2025-03-05 PROCEDURE — 84443 ASSAY THYROID STIM HORMONE: CPT | Performed by: FAMILY MEDICINE

## 2025-03-05 ASSESSMENT — PAIN SCALES - GENERAL: PAINLEVEL_OUTOF10: MODERATE PAIN (5)

## 2025-03-05 NOTE — PATIENT INSTRUCTIONS
How to Take Your Medication Before Surgery  Preoperative Medication Instructions   Antiplatelet or Anticoagulation Medication Instructions   - We reviewed the medication list and the patient is not on an antiplatelet or anticoagulation medications.    Additional Medication Instructions   - GLP-1 Injectable (exenitide, liraglutide, semaglutide, dulaglutide, etc.): DO NOT TAKE 7 days before surgery        Patient Education   Preparing for Your Surgery  For Adults  Getting started  In most cases, a nurse will call to review your health history and instructions. They will give you an arrival time based on your scheduled surgery time. Please be ready to share:  Your doctor's clinic name and phone number  Your medical, surgical, and anesthesia history  A list of allergies and sensitivities  A list of medicines, including herbal treatments and over-the-counter drugs  Whether the patient has a legal guardian (ask how to send us the papers in advance)  Note: You may not receive a call if you were seen at our PAC (Preoperative Assessment Center).  Please tell us if you're pregnant--or if there's any chance you might be pregnant. Some surgeries may injure a fetus (unborn baby), so they require a pregnancy test. Surgeries that are safe for a fetus don't always need a test, and you can choose whether to have one.   Preparing for surgery  Within 10 to 30 days of surgery: Have a pre-op exam (sometimes called an H&P, or History and Physical). This can be done at a clinic or pre-operative center.  If you're having a , you may not need this exam. Talk to your care team.  At your pre-op exam, talk to your care team about all medicines you take. (This includes CBD oil and any drugs, such as THC, marijuana, and other forms of cannabis.) If you need to stop any medicine before surgery, ask when to start taking it again.  This is for your safety. Many medicines and drugs can make you bleed too much during surgery. Some change how  well surgery (anesthesia) drugs work.  Call your insurance company to let them know you're having surgery. (If you don't have insurance, call [phone].)  Call your clinic if there's any change in your health. This includes a scrape or scratch near the surgery site, or any signs of a cold (sore throat, runny nose, cough, rash, fever).  Eating and drinking guidelines  For your safety: Unless your surgeon tells you otherwise, follow the guidelines below.  Eat and drink as normal until 8 hours before you arrive for surgery. After that, no food or milk. You can spit out gum when you arrive.  Drink clear liquids until 2 hours before you arrive. These are liquids you can see through, like water, Gatorade, and Propel Water. They also include plain black coffee and tea (no cream or milk).  No alcohol for 24 hours before you arrive. The night before surgery, stop any drinks that contain THC.  If your care team tells you to take medicine on the morning of surgery, it's okay to take it with a sip of water. No other medicines or drugs are allowed (including CBD oil)--follow your care team's instructions.  If you have questions the day of surgery, call your hospital or surgery center.   Preventing infection  Shower or bathe the night before and the morning of surgery. Follow the instructions your clinic gave you. (If no instructions, use regular soap.)  Don't shave or clip hair near your surgery site. We'll remove the hair if needed.  Don't smoke or vape the morning of surgery. No chewing tobacco for 6 hours before you arrive. A nicotine patch is okay. You may spit out nicotine gum when you arrive.  For some surgeries, the surgeon will tell you to fully quit smoking and nicotine.  We will make every effort to keep you safe from infection. We will:  Clean our hands often with soap and water (or an alcohol-based hand rub).  Clean the skin at your surgery site with a special soap that kills germs.  Give you a special gown to  keep you warm. (Cold raises the risk of infection.)  Wear hair covers, masks, gowns, and gloves during surgery.  Give antibiotic medicine, if prescribed. Not all surgeries need this medicine.  What to bring on the day of surgery  Photo ID and insurance card  Copy of your health care directive, if you have one  Glasses and hearing aids (bring cases)  You can't wear contacts during surgery  Inhaler and eye drops, if you use them (tell us about these when you arrive)  CPAP machine or breathing device, if you use them  A few personal items, if spending the night  If you have . . .  A pacemaker, ICD (cardiac defibrillator), or other implant: Bring the ID card.  An implanted stimulator: Bring the remote control.  A legal guardian: Bring a copy of the certified (court-stamped) guardianship papers.  Please remove any jewelry, including body piercings. Leave jewelry and other valuables at home.  If you're going home the day of surgery  You must have a responsible adult drive you home. They should stay with you overnight as well.  If you don't have someone to stay with you, and you aren't safe to go home alone, we may keep you overnight. Insurance often won't pay for this.  After surgery  If it's hard to control your pain or you need more pain medicine, please call your surgeon's office.  Questions?   If you have any questions for your care team, list them here:   ____________________________________________________________________________________________________________________________________________________________________________________________________________________________________________________________  For informational purposes only. Not to replace the advice of your health care provider. Copyright   2003, 2019 [facility]. All rights reserved. Clinically reviewed by Merrick Ramon MD. [employer] 360101 - REV 08/24.    The 10-year ASCVD risk score (Lola DK, et al., 2019) is: 18.5%    Values used  to calculate the score:      Age: 74 years      Sex: Female      Is Non- : No      Diabetic: No      Tobacco smoker: No      Systolic Blood Pressure: 137 mmHg      Is BP treated: No      HDL Cholesterol: 63 mg/dL      Total Cholesterol: 254 mg/dL      Water can be your best friend or worst enemy.    If water is not your enemy, shower/bath daily.    NEVER soak in bubble bath, oils, etc.    Keep them to 15-30 minutes in lukewarm (NOT HOT) water.  After shower, pat/blot dry and apply moisturizer within minutes    Products that are advised for eczema include:  Soaps -- Dove, Caress, or Basis (only need in underarms and groin unless dirt noted)  Lotions -- Cera Ve, Cetaphil, Vanicream, Vaseline  Petroleum jelly can be used for extra moisturizer when needed but is greasy.

## 2025-03-05 NOTE — PROGRESS NOTES
Preoperative Evaluation  [address]  [facility] [address]  Phone: [phone]  Fax: [phone]  Primary Provider: ANJANA Carrero CNP  Pre-op Performing Provider: Michelle Olmos MD, MD  Mar 5, 2025             3/5/2025   Surgical Information   What procedure is being done? eye lid lift   Facility or Hospital where procedure/surgery will be performed: [facility] in [place]   Who is doing the procedure / surgery? Kathy Sy   Date of surgery / procedure: March 12   Time of surgery / procedure: Not set yet   Where do you plan to recover after surgery? at home alone     Fax number for surgical facility: [phone] [facility]     Assessment & Plan     The proposed surgical procedure is considered INTERMEDIATE risk.        ICD-10-CM    1. Preop general physical exam  Z01.818 EKG 12-lead complete w/read - Clinics      2. Acquired blepharoptosis of both eyes  H02.403       3. Impaired fasting glucose  R73.01       4. Morbid obesity (H)  E66.01 TSH with free T4 reflex     Vitamin D Deficiency     Lipid panel reflex to direct LDL Non-fasting     TSH with free T4 reflex     Vitamin D Deficiency     Lipid panel reflex to direct LDL Non-fasting      5. Hyperlipidemia, unspecified hyperlipidemia type  E78.5       6. Hyperlipidemia  E78.5       7. Need for shingles vaccine  Z23       8. Need for vaccination against respiratory syncytial virus  Z29.11       9. Screen for colon cancer  Z12.11       10. Abnormal weight loss  R63.4 TSH with free T4 reflex     TSH with free T4 reflex          Consent was obtained from the patient to use an AI documentation tool in the creation of this note.    Assessment & Plan  Preop general physical exam:  - Preoperative evaluation for blepharoplasty.  - Perform EKG as part of preoperative assessment. Hold semaglutide and fat-burning injections one week prior to surgery. Continue using eczema creams,  avoiding application near surgical area.    Acquired blepharoptosis of both eyes:  - Eyelid lift surgery planned due to impaired peripheral vision affecting driving.  - Proceed with blepharoplasty under anesthesia. Ensure EKG is completed preoperatively.  - Risks and side effects: Potential bleeding risk discussed; hold medications as advised.    Hyperlipidemia, unspecified hyperlipidemia type:  - Cholesterol levels increased from 234 to 254, LDL from 151 to 168.  - Discussed lifestyle modifications including weight loss, exercise, and diet. Medications offered but declined.    Need for shingles vaccine:  - History of shingles, interested in vaccination.  - Consider shingles vaccine, covered by Medicare as a pharmacy benefit.  - Risks and side effects: Vaccine can be achy.    Screen for colon cancer:  - Screening through insurance company using Cologuard.  - Await Cologuard kit from insurance company.    Has been working with outside provider on weight loss and she is requesting labs to help. Ordered today - thyroid, lipid, and vit D. Took info at lab to link to orders to direct to the weight loss provider as that provider did not sign her orders to get direct resulting.    No LOS data to display   Time spent by me today doing chart review, history and exam, documentation and further activities per the note    Michelle Olmos MD             - No identified additional risk factors other than previously addressed    Preoperative Medication Instructions  Antiplatelet or Anticoagulation Medication Instructions   - We reviewed the medication list and the patient is not on an antiplatelet or anticoagulation medications.    Additional Medication Instructions   - GLP-1 Injectable (exenitide, liraglutide, semaglutide, dulaglutide, etc.): DO NOT TAKE 7 days before surgery     Recommendation  Approval given to proceed with proposed procedure, without further diagnostic evaluation.    Simon Gonzalez is a 74 year old,  presenting for the following:  Pre-Op Exam          3/5/2025    10:36 AM   Additional Questions   Roomed by carla   Accompanied by self     HPI: vision change due to eyelid lag          3/5/2025   Pre-Op Questionnaire   Have you ever had a heart attack or stroke? No   Have you ever had surgery on your heart or blood vessels, such as a stent placement, a coronary artery bypass, or surgery on an artery in your head, neck, heart, or legs? No   Do you have chest pain with activity? No   Do you have a history of heart failure? No   Do you currently have a cold, bronchitis or symptoms of other infection? No   Do you have a cough, shortness of breath, or wheezing? No   Do you or anyone in your family have previous history of blood clots? No   Do you or does anyone in your family have a serious bleeding problem such as prolonged bleeding following surgeries or cuts? No   Have you ever had problems with anemia or been told to take iron pills? (!) YES related to menses   Have you had any abnormal blood loss such as black, tarry or bloody stools, or abnormal vaginal bleeding? No   Have you ever had a blood transfusion? No   Are you willing to have a blood transfusion if it is medically needed before, during, or after your surgery? Yes   Have you or any of your relatives ever had problems with anesthesia? No   Do you have sleep apnea, excessive snoring or daytime drowsiness? No   Do you have any artifical heart valves or other implanted medical devices like a pacemaker, defibrillator, or continuous glucose monitor? No   Do you have artificial joints? No   Are you allergic to latex? No     Health Care Directive  Patient does not have a Health Care Directive: Discussed advance care planning with patient; information given to patient to review.    Preoperative Review of    reviewed - no record of controlled substances prescribed.          Patient Active Problem List    Diagnosis Date Noted    Seasonal affective disorder  10/28/2024     Priority: Medium    Morbid obesity (H) 10/26/2022     Priority: Medium    Hyperlipidemia 08/01/2014     Priority: Medium     Formatting of this note might be different from the original.  , 163      Impaired fasting glucose 08/01/2014     Priority: Medium     Formatting of this note might be different from the original.   in 2007, 118 in 2010.      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  ICD 10        No past medical history on file.  Past Surgical History:   Procedure Laterality Date    GYN SURGERY  1985?    tubali ligation     Current Outpatient Medications   Medication Sig Dispense Refill    clobetasol propionate (TEMOVATE) 0.05 % external cream Apply topically 2 times daily.      halobetasol (ULTRAVATE) 0.05 % ointment Apply topically 2 times daily.      mometasone (ELOCON) 0.1 % external cream Apply topically daily.      omeprazole (PRILOSEC) 20 MG DR capsule Take 20 mg by mouth as needed      vitamin B complex with vitamin C (STRESS TAB) tablet Take 1 tablet by mouth daily (Patient not taking: Reported on 3/5/2025)         Allergies   Allergen Reactions    Sulfamethoxazole-Trimethoprim GI Disturbance     Abdominal cramping        Social History     Tobacco Use    Smoking status: Former     Current packs/day: 0.00     Average packs/day: 0.5 packs/day for 43.0 years (21.5 ttl pk-yrs)     Types: Cigarettes     Start date: 1/1/1967     Quit date: 1/1/2010     Years since quitting: 15.1     Passive exposure: Never    Smokeless tobacco: Never   Substance Use Topics    Alcohol use: Yes     Alcohol/week: 3.0 standard drinks of alcohol     Types: 3 Standard drinks or equivalent per week       History   Drug Use Unknown             Review of Systems  Constitutional, HEENT, cardiovascular, pulmonary, GI, , musculoskeletal, neuro, skin, endocrine and psych  "systems are negative, except as otherwise noted.    Objective    /81   Pulse 63   Temp 97.5  F (36.4  C) (Temporal)   Resp 10   Ht 1.578 m (5' 2.13\")   Wt 99.1 kg (218 lb 8 oz)   SpO2 98%   BMI 39.80 kg/m     Estimated body mass index is 39.8 kg/m  as calculated from the following:    Height as of this encounter: 1.578 m (5' 2.13\").    Weight as of this encounter: 99.1 kg (218 lb 8 oz).  Physical Exam  GENERAL: alert and no distress  EYES: Eyes grossly normal to inspection, PERRL and conjunctivae and sclerae normal  HENT: ear canals and TM's normal, nose and mouth without ulcers or lesions  NECK: no adenopathy, no asymmetry, masses, or scars  RESP: lungs clear to auscultation - no rales, rhonchi or wheezes  CV: regular rate and rhythm, normal S1 S2, no S3 or S4, no murmur, click or rub, no peripheral edema  ABDOMEN: soft, nontender, no hepatosplenomegaly, no masses and bowel sounds normal  MS: no gross musculoskeletal defects noted, no edema  SKIN: no suspicious lesions or rashes  NEURO: Normal strength and tone, mentation intact and speech normal  PSYCH: mentation appears normal, affect normal/bright    Recent Labs   Lab Test 10/28/24  1416      POTASSIUM 4.7   CR 0.77   A1C 5.8*        Diagnostics  No labs were ordered during this visit.   EKG: appears normal, NSR, normal axis, normal intervals, no acute ST/T changes c/w ischemia, no LVH by voltage criteria, unchanged from previous tracings    Revised Cardiac Risk Index (RCRI)  The patient has the following serious cardiovascular risks for perioperative complications:   - No serious cardiac risks = 0 points     RCRI Interpretation: 0 points: Class I (very low risk - 0.4% complication rate)         Signed Electronically by: Michelle Olmos MD, MD  A copy of this evaluation report is provided to the requesting physician.        "

## 2025-05-01 ENCOUNTER — NURSE TRIAGE (OUTPATIENT)
Dept: FAMILY MEDICINE | Facility: OTHER | Age: 75
End: 2025-05-01

## 2025-05-01 ENCOUNTER — ANCILLARY PROCEDURE (OUTPATIENT)
Dept: GENERAL RADIOLOGY | Facility: CLINIC | Age: 75
End: 2025-05-01
Attending: PHYSICIAN ASSISTANT
Payer: COMMERCIAL

## 2025-05-01 ENCOUNTER — OFFICE VISIT (OUTPATIENT)
Dept: FAMILY MEDICINE | Facility: CLINIC | Age: 75
End: 2025-05-01
Payer: COMMERCIAL

## 2025-05-01 VITALS
WEIGHT: 205.5 LBS | SYSTOLIC BLOOD PRESSURE: 140 MMHG | HEART RATE: 79 BPM | TEMPERATURE: 98.3 F | DIASTOLIC BLOOD PRESSURE: 82 MMHG | BODY MASS INDEX: 37.81 KG/M2 | RESPIRATION RATE: 18 BRPM | HEIGHT: 62 IN | OXYGEN SATURATION: 96 %

## 2025-05-01 DIAGNOSIS — S67.10XA CRUSHING INJURY OF FINGER, INITIAL ENCOUNTER: ICD-10-CM

## 2025-05-01 DIAGNOSIS — S62.656A CLOSED NONDISPLACED FRACTURE OF MIDDLE PHALANX OF RIGHT LITTLE FINGER, INITIAL ENCOUNTER: Primary | ICD-10-CM

## 2025-05-01 DIAGNOSIS — T14.8XXA WOUND INFECTION: ICD-10-CM

## 2025-05-01 DIAGNOSIS — Z23 NEED FOR VACCINATION: ICD-10-CM

## 2025-05-01 DIAGNOSIS — L08.9 WOUND INFECTION: ICD-10-CM

## 2025-05-01 RX ORDER — CEPHALEXIN 500 MG/1
500 CAPSULE ORAL 3 TIMES DAILY
Qty: 30 CAPSULE | Refills: 0 | Status: SHIPPED | OUTPATIENT
Start: 2025-05-01 | End: 2025-05-11

## 2025-05-01 ASSESSMENT — PAIN SCALES - GENERAL: PAINLEVEL_OUTOF10: MODERATE PAIN (4)

## 2025-05-01 NOTE — TELEPHONE ENCOUNTER
"Nurse Triage SBAR    Is this a 2nd Level Triage? NO    Situation: Patient calling in with concerns about pinky injury on LEFT hand 2 1/2 weeks ago, now swollen with increased redness and slight pain.     Background: Slammed hand in a car door 2 1/2 weeks ago, thought it was healing, but has more redness/swelling/discomfort today.     Assessment: See assessment info below. Denies any drainage or other signs of infection, denies fever.     Protocol Recommended Disposition:   See in Office Today    Recommendation: Assisted with scheduling per disposition.      Reason for Disposition   Patient wants to be seen    Additional Information   Negative: Major bleeding (actively dripping or spurting) that can't be stopped   Negative: Sounds like a life-threatening emergency to the triager   Negative: Wound looks infected (e.g., spreading redness, pus)   Negative: Caused by animal bite   Negative: Amputation   Negative: High-pressure injection injury (e.g., from paint gun, usually work-related)   Negative: Looks like a broken bone or dislocated joint (e.g., crooked or deformed)   Negative: Skin is split open or gaping (length > 1/2 inch or 12 mm)   Negative: Cut or scrape is very deep (e.g., can see bone or tendons)   Negative: Bleeding won't stop after 10 minutes of direct pressure (using correct technique)   Negative: Dirt in the wound and not removed after 15 minutes of scrubbing   Negative: Cut with numbness (loss of sensation) of finger   Negative: Fingernail is partially torn from a crush injury  (Exception: Torn nail from catching it on something.)   Negative: Sounds like a serious injury to the triager    Answer Assessment - Initial Assessment Questions  1. MECHANISM: \"How did the injury happen?\"       Slammed in a car door    2. ONSET: \"When did the injury happen?\" (e.g., minutes, hours ago)       Two and a half weeks ago, 4/11/25    3. LOCATION: \"What part of the finger is injured?\" \"Is the nail damaged?\"       Pinky " "finger on LEFT hand; finger nail not damaged    4. APPEARANCE of the INJURY: \"What does the injury look like?\"       Swollen, red    5. SEVERITY: \"Can you use the hand normally?\"  \"Can you bend your fingers into a ball and then fully open them?\"      Able to bend and move    6. SIZE: For cuts, bruises, or swelling, ask: \"How large is it?\" (e.g., inches or centimeters;  entire finger)       Swelling at first/top knuckle; there was a cut, but cut is healing    7. PAIN: \"Is there pain?\" If Yes, ask: \"How bad is the pain?\"  (Scale 0-10; or none, mild, moderate, severe)      4/10    8. TETANUS: For any breaks in the skin, ask: \"When was the last tetanus booster?\"      2017 was last tetanus shot    9. OTHER SYMPTOMS: \"Do you have any other symptoms?\"      No other symptoms    10. PREGNANCY: \"Is there any chance you are pregnant?\" \"When was your last menstrual period?\"        no    Protocols used: Finger Injury-A-[place]    "

## 2025-05-01 NOTE — PROGRESS NOTES
"  Assessment & Plan     Closed nondisplaced fracture of middle phalanx of right little finger, initial encounter  Distal middle phalanx slightly displaced.  Await radiology view.  Splint applied  See patient instructions.    Wound infection  Risks and benefits discussed. Follow up if worsening or persistent symptoms.  - cephALEXin (KEFLEX) 500 MG capsule; Take 1 capsule (500 mg) by mouth 3 times daily for 10 days.    Crushing injury of finger, initial encounter  - XR Finger Left G/E 2 Views; Future    Need for vaccination  Patient will get TDAP today at the pharmacy. Medicare part D  - Tdap, tetanus-diptheria-acell pertussis, (BOOSTRIX) 5-2.5-18.5 LF-MCG/0.5 BING injection; Inject 0.5 mLs into the muscle once for 1 dose.        Simon Gonzalez is a 75 year old, presenting for the following health issues:  Hand Injury  -Left pinky finger smashed in the car door about 10 days ago. Bruised and swollen for the first couple of days and then went away but a couple of days ago it started to swell, can notice an indent and has been more painful. Has been waking her up in the middle of the night the last couple of days.     No fever or malaise.  No history of serious skin infection or MRSA.      5/1/2025    11:26 AM   Additional Questions   Roomed by AD   Accompanied by Self     History of Present Illness       Reason for visit:  Check little finger was smashed in car door  Symptom onset:  1-3 days ago   She is taking medications regularly.            Review of Systems  CONSTITUTIONAL: NEGATIVE for fever, chills, change in weight  ENT/MOUTH: NEGATIVE for ear, mouth and throat problems  RESP: NEGATIVE for significant cough or SOB  CV: NEGATIVE for chest pain, palpitations or peripheral edema  MUSCULOSKELETAL: Se HPI  NEURO: NEGATIVE for weakness, dizziness or paresthesias      Objective    BP (!) 140/82   Pulse 79   Temp 98.3  F (36.8  C) (Temporal)   Resp 18   Ht 1.578 m (5' 2.13\")   Wt 93.2 kg (205 lb 8 oz)   LMP "  (LMP Unknown)   SpO2 96%   Breastfeeding No   BMI 37.43 kg/m    Body mass index is 37.43 kg/m .  Physical Exam  Vitals reviewed.   Constitutional:       General: She is not in acute distress.  Eyes:      Conjunctiva/sclera: Conjunctivae normal.   Pulmonary:      Effort: No respiratory distress.   Musculoskeletal:      Comments: Right hand shows swelling at the distal middle phalanx of the right fifth finger.  There is some increased warmth and tenderness to palpation.  There is a healed minor superficial skin injury.  There appears to be tenderness of the soft tissue but also of the joint.  No sensory deficits.  Range of motion intact.  No lymphangitis.   Skin:     General: Skin is warm and dry.      Findings: No rash.   Neurological:      General: No focal deficit present.      Mental Status: She is alert.   Psychiatric:         Mood and Affect: Mood normal.              Signed Electronically by: Rosario Perez PA-C

## 2025-05-01 NOTE — PATIENT INSTRUCTIONS
Wear the finger splint for the next 2 weeks. I suspect there is a fracture as we talked about in clinic and will confirm this with the radiology report.     Your injury is now infected. You should be seen urgently if you symptoms worsen or persist. You should have improvement in pain and swelling in the next 48-72 hours.    You should get a tetanus shot at the pharmacy as soon as possible.    An xray was ordered at your visit today.  Though I will review the xray all final results will come from the radiologist-a specialist in reading xrays.  If those results are not available at the time of your visit you will be able to see those results in [facility].  I will comment on all results.  If you do not hear from me or have any additional question please send me a NXTM message or call the clinic.    Any antibiotic can cause an allergic reaction whether you have had it before or not. If you develop swelling around the eyes, lips or breakout in a rash you should stop the medication, take Benadryl and let you us know.    Consider taking a probiotic while taking an antibiotic which may decrease the incidence of developing diarrhea associated with antibiotic use. . This can be as simple as eating yogurt a couple times daily.

## 2025-05-09 ENCOUNTER — OFFICE VISIT (OUTPATIENT)
Dept: FAMILY MEDICINE | Facility: CLINIC | Age: 75
End: 2025-05-09
Payer: COMMERCIAL

## 2025-05-09 VITALS
HEART RATE: 58 BPM | RESPIRATION RATE: 16 BRPM | HEIGHT: 62 IN | WEIGHT: 203 LBS | DIASTOLIC BLOOD PRESSURE: 82 MMHG | TEMPERATURE: 98.1 F | OXYGEN SATURATION: 98 % | BODY MASS INDEX: 37.36 KG/M2 | SYSTOLIC BLOOD PRESSURE: 130 MMHG

## 2025-05-09 DIAGNOSIS — S62.627G: ICD-10-CM

## 2025-05-09 DIAGNOSIS — S69.92XD INJURY OF FINGER OF LEFT HAND, SUBSEQUENT ENCOUNTER: Primary | ICD-10-CM

## 2025-05-09 PROCEDURE — 99214 OFFICE O/P EST MOD 30 MIN: CPT | Performed by: FAMILY MEDICINE

## 2025-05-09 PROCEDURE — 1125F AMNT PAIN NOTED PAIN PRSNT: CPT | Performed by: FAMILY MEDICINE

## 2025-05-09 PROCEDURE — 3075F SYST BP GE 130 - 139MM HG: CPT | Performed by: FAMILY MEDICINE

## 2025-05-09 PROCEDURE — 3079F DIAST BP 80-89 MM HG: CPT | Performed by: FAMILY MEDICINE

## 2025-05-09 ASSESSMENT — PAIN SCALES - GENERAL: PAINLEVEL_OUTOF10: MODERATE PAIN (5)

## 2025-05-09 NOTE — PROGRESS NOTES
"  Assessment & Plan     Injury of finger of left hand, subsequent encounter  MRI ordered, finger splint dispensed, ice three times daily to four times daily, elevated.   - diclofenac (VOLTAREN) 1 % topical gel; Apply 4 g topically 4 times daily.  - MR Finger Left w/o Contrast; Future    Closed displaced fracture of middle phalanx of left little finger with delayed healing, subsequent encounter  Addendum     Narrative & Impression   MR left hand  without contrast 5/12/2025 9:11 AM     Comminuted intra-articular fracture of the head of the fifth middle  phalanx.  Signed Electronically by: Juliana Aguilar MD      BMI  Estimated body mass index is 36.97 kg/m  as calculated from the following:    Height as of this encounter: 1.578 m (5' 2.13\").    Weight as of this encounter: 92.1 kg (203 lb).             Simon Gonzalez is a 75 year old, presenting for the following health issues:  Finger      5/9/2025    10:50 AM   Additional Questions   Roomed by BT   Accompanied by self     April 11th trauma to left 5th finger, slammed on door by accident.   Concern - Follow up on injured left 5th finger   Onset: 1 week  Description: swollen and painful  Intensity: moderate  Progression of Symptoms:  worsening  Accompanying Signs & Symptoms: swelling and pain  Previous history of similar problem: none   Precipitating factors:        Worsened by: movement or touching   Alleviating factors:        Improved by: n/a   Therapies tried and outcome: None      Review of Systems  Constitutional, neuro, ENT, endocrine, pulmonary, cardiac, gastrointestinal, genitourinary, musculoskeletal, integument and psychiatric systems are negative, except as otherwise noted.      Objective    /82   Pulse 58   Temp 98.1  F (36.7  C) (Temporal)   Resp 16   Ht 1.578 m (5' 2.13\")   Wt 92.1 kg (203 lb)   LMP  (LMP Unknown)   SpO2 98%   BMI 36.97 kg/m    Body mass index is 36.97 kg/m .  Physical Exam   GENERAL: alert and no distress  MS: " Left 5th finger with swelling, reduced ROM, ecchymosis without warmth or erythema..      Impression:         Answers submitted by the patient for this visit:  General Questionnaire (Submitted on 5/1/2025)  Chief Complaint: Chronic problems general questions HPI Form  How many days per week do you miss taking your medication?: 0  General Concern (Submitted on 5/1/2025)  Chief Complaint: Chronic problems general questions HPI Form  What is the reason for your visit today?: check little finger was smashed in car door  When did your symptoms begin?: 1-3 days ago  Questionnaire about: Chronic problems general questions HPI Form (Submitted on 5/1/2025)  Chief Complaint: Chronic problems general questions HPI Form

## 2025-05-12 ENCOUNTER — HOSPITAL ENCOUNTER (OUTPATIENT)
Dept: MRI IMAGING | Facility: CLINIC | Age: 75
Discharge: HOME | End: 2025-05-12
Attending: FAMILY MEDICINE | Admitting: FAMILY MEDICINE
Payer: COMMERCIAL

## 2025-05-12 DIAGNOSIS — S69.92XD INJURY OF FINGER OF LEFT HAND, SUBSEQUENT ENCOUNTER: ICD-10-CM

## 2025-05-12 PROCEDURE — 73221 MRI JOINT UPR EXTREM W/O DYE: CPT | Mod: 26 | Performed by: RADIOLOGY

## 2025-05-12 PROCEDURE — 73221 MRI JOINT UPR EXTREM W/O DYE: CPT | Mod: LT

## 2025-05-14 ENCOUNTER — RESULTS FOLLOW-UP (OUTPATIENT)
Dept: FAMILY MEDICINE | Facility: CLINIC | Age: 75
End: 2025-05-14

## 2025-05-27 SDOH — HEALTH STABILITY: PHYSICAL HEALTH: ON AVERAGE, HOW MANY MINUTES DO YOU ENGAGE IN EXERCISE AT THIS LEVEL?: 40 MIN

## 2025-05-27 SDOH — HEALTH STABILITY: PHYSICAL HEALTH: ON AVERAGE, HOW MANY DAYS PER WEEK DO YOU ENGAGE IN MODERATE TO STRENUOUS EXERCISE (LIKE A BRISK WALK)?: 4 DAYS

## 2025-05-27 NOTE — TELEPHONE ENCOUNTER
REASON FOR VISIT: Left pinkie finger fracture, 4/11/25    DATE OF APPT: 5/29/2025   NOTES (FOR ALL VISITS) STATUS DETAILS   OFFICE NOTE from referring provider [facility] Juliana Tate MD 5/15/2025   EMG N/A    MEDICATION LIST N/A    IMAGING  (FOR ALL VISITS)     XR [facility] Jose J  XR Finger left 5/01/2025   MRI (HEAD, NECK, SPINE) [facility]  MR Finger left 5/12/2025   CT (HEAD, NECK, SPINE) N/A

## 2025-05-29 ENCOUNTER — ANCILLARY PROCEDURE (OUTPATIENT)
Dept: GENERAL RADIOLOGY | Facility: CLINIC | Age: 75
End: 2025-05-29
Attending: FAMILY MEDICINE
Payer: COMMERCIAL

## 2025-05-29 ENCOUNTER — OFFICE VISIT (OUTPATIENT)
Dept: ORTHOPEDICS | Facility: CLINIC | Age: 75
End: 2025-05-29
Attending: FAMILY MEDICINE
Payer: COMMERCIAL

## 2025-05-29 ENCOUNTER — PRE VISIT (OUTPATIENT)
Dept: ORTHOPEDICS | Facility: CLINIC | Age: 75
End: 2025-05-29

## 2025-05-29 DIAGNOSIS — S69.92XD INJURY OF FINGER OF LEFT HAND, SUBSEQUENT ENCOUNTER: ICD-10-CM

## 2025-05-29 DIAGNOSIS — S62.627G: ICD-10-CM

## 2025-05-29 DIAGNOSIS — S69.92XD INJURY OF FINGER OF LEFT HAND, SUBSEQUENT ENCOUNTER: Primary | ICD-10-CM

## 2025-05-29 PROCEDURE — 73140 X-RAY EXAM OF FINGER(S): CPT | Mod: LT | Performed by: RADIOLOGY

## 2025-05-29 ASSESSMENT — PAIN SCALES - GENERAL: PAINLEVEL_OUTOF10: MILD PAIN (3)

## 2025-05-29 NOTE — NURSING NOTE
Chief Complaint   Patient presents with    Left Hand - Pain, New Patient     Left finger fx       There were no vitals filed for this visit.    There is no height or weight on file to calculate BMI.      ZACK Holliday NREMT

## 2025-05-29 NOTE — PROGRESS NOTES
CHIEF COMPLAINT:  Pain and New Patient of the Left Hand (Left finger fx)       HISTORY OF PRESENT ILLNESS  Ms. Camarena is a 75 year old female who presents to clinic today with left hand pain.  Lisa injured her finger approximately 2 months ago.  She had her hand in a door frame when her daughter closed the car door, closing her finger in the door.  She was seen subsequently was diagnosed with a finger fracture, she also had.  She has been using a AlumaFoam splint for the past many weeks.  Her pain has improved, she will have some pain at times whenever she comes out of her splint.  She has been spending about half of each day out of her splint.        Additional history: as documented    MEDICAL HISTORY  Patient Active Problem List   Diagnosis    Granuloma annulare    Hyperlipidemia    Impaired fasting glucose    Multiple atypical nevi    Morbid obesity (H)    Seasonal affective disorder       Current Outpatient Medications   Medication Sig Dispense Refill    clobetasol propionate (TEMOVATE) 0.05 % external cream Apply topically 2 times daily.      halobetasol (ULTRAVATE) 0.05 % ointment Apply topically 2 times daily.      mometasone (ELOCON) 0.1 % external cream Apply topically daily.      omeprazole (PRILOSEC) 20 MG DR capsule Take 20 mg by mouth as needed      semaglutide (OZEMPIC) 2 MG/3ML pen Inject 0.5 mg subcutaneously every 7 days.      diclofenac (VOLTAREN) 1 % topical gel Apply 4 g topically 4 times daily. 350 g 1       Allergies   Allergen Reactions    Sulfamethoxazole-Trimethoprim GI Disturbance     Abdominal cramping       Family History   Problem Relation Age of Onset    Other - See Comments Mother          covid complications    Heart Disease Father 59    Breast Cancer Daughter        Additional medical/Social/Surgical histories reviewed in [facility] and updated as appropriate.        PHYSICAL EXAM  General  - normal appearance, in no obvious distress    Musculoskeletal - left little finger  -  inspection: swelling at DIP joint  - palpation: no tenderness  - ROM: globally restricted at DIP and PIP  - special tests:  (-) varus  (-) valgus  Neuro  - no numbness, no motor deficit, grossly normal coordination, normal muscle tone  Skin  - no ecchymosis, erythema, warmth, or induration, no obvious rash             ASSESSMENT & PLAN  Ms. Camarena is a 75 year old female who presents to clinic today with a left 5th finger injury.    I ordered and independently reviewed an xray of her left 5th finger, this redemonstrates her known fracture at the distal middle phalanx, there is some mild volar displacement.    Her fracture does appear to be stable compared to her previous x-ray, which is reassuring.  Given her lack of significant pain I do think it would be reasonable to transition to a micaela loop as opposed to full-time in her splint.  I am referring her to hand therapy as well.    She is going to have some permanent change to her finger and may have some mild permanent restriction, although if her symptoms are not improving whatsoever she should get in touch with us.    It was a pleasure seeing Lisa today.    Radames Vieira DO, [facility]  Primary Care Sports Medicine      This note was constructed using Dragon dictation software, please excuse any minor errors in spelling, grammar, or syntax.

## 2025-05-29 NOTE — LETTER
2025      Lisa Camarena  [address] [facility] [address]      Dear Colleague,    Thank you for referring your patient, Lisa Camarena, to the [facility] [place]. Please see a copy of my visit note below.    CHIEF COMPLAINT:  Pain and New Patient of the Left Hand (Left finger fx)       HISTORY OF PRESENT ILLNESS  Ms. Camarena is a 75 year old female who presents to clinic today with left hand pain.  Lisa injured her finger approximately 2 months ago.  She had her hand in a door frame when her daughter closed the car door, closing her finger in the door.  She was seen subsequently was diagnosed with a finger fracture, she also had.  She has been using a AlumaFoam splint for the past many weeks.  Her pain has improved, she will have some pain at times whenever she comes out of her splint.  She has been spending about half of each day out of her splint.        Additional history: as documented    MEDICAL HISTORY  Patient Active Problem List   Diagnosis     Granuloma annulare     Hyperlipidemia     Impaired fasting glucose     Multiple atypical nevi     Morbid obesity (H)     Seasonal affective disorder       Current Outpatient Medications   Medication Sig Dispense Refill     clobetasol propionate (TEMOVATE) 0.05 % external cream Apply topically 2 times daily.       halobetasol (ULTRAVATE) 0.05 % ointment Apply topically 2 times daily.       mometasone (ELOCON) 0.1 % external cream Apply topically daily.       omeprazole (PRILOSEC) 20 MG DR capsule Take 20 mg by mouth as needed       semaglutide (OZEMPIC) 2 MG/3ML pen Inject 0.5 mg subcutaneously every 7 days.       diclofenac (VOLTAREN) 1 % topical gel Apply 4 g topically 4 times daily. 350 g 1       Allergies   Allergen Reactions     Sulfamethoxazole-Trimethoprim GI Disturbance     Abdominal cramping       Family History   Problem Relation Age of Onset     Other - See Comments Mother          covid  complications     Heart Disease Father 59     Breast Cancer Daughter        Additional medical/Social/Surgical histories reviewed in EPIC and updated as appropriate.        PHYSICAL EXAM  General  - normal appearance, in no obvious distress    Musculoskeletal - left little finger  - inspection: swelling at DIP joint  - palpation: no tenderness  - ROM: globally restricted at DIP and PIP  - special tests:  (-) varus  (-) valgus  Neuro  - no numbness, no motor deficit, grossly normal coordination, normal muscle tone  Skin  - no ecchymosis, erythema, warmth, or induration, no obvious rash        {Diagnostic Test Results (Optional):374552}     ASSESSMENT & PLAN  Ms. Camarena is a 75 year old female who presents to clinic today with a left 5th finger injury.    I ordered and independently reviewed an xray of her left 5th finger, this redemonstrates her known fracture at the distal middle phalanx, there is some mild volar displacement.    Her fracture does appear to be stable compared to her previous x-ray, which is reassuring.  Given her lack of significant pain I do think it would be reasonable to transition to a micaela loop as opposed to full-time in her splint.  I am referring her to hand therapy as well.    She is going to have some permanent change to her finger and may have some mild permanent restriction, although if her symptoms are not improving whatsoever she should get in touch with us.    It was a pleasure seeing Lisa today.    Radames Vieira DO, [facility]  Primary Care Sports Medicine      This note was constructed using Dragon dictation software, please excuse any minor errors in spelling, grammar, or syntax.      Again, thank you for allowing me to participate in the care of your patient.        Sincerely,        Radames Vieira DO    Electronically signed

## 2025-06-26 ENCOUNTER — THERAPY VISIT (OUTPATIENT)
Dept: OCCUPATIONAL THERAPY | Facility: CLINIC | Age: 75
End: 2025-06-26
Attending: FAMILY MEDICINE
Payer: COMMERCIAL

## 2025-06-26 DIAGNOSIS — S69.92XD INJURY OF FINGER OF LEFT HAND, SUBSEQUENT ENCOUNTER: ICD-10-CM

## 2025-06-26 NOTE — PROGRESS NOTES
OCCUPATIONAL THERAPY EVALUATION  Type of Visit: Evaluation    Fall Risk Screen:  Have you fallen 2 or more times in the past year?: No  Have you fallen and had an injury in the past year?: No    Subjective      Condition type:  Chronic (continuous duration >3 months)  Cause of current episode:  Traumatic     Nature of treatment:  Rehabilitative  Functional ability:  Minimal functional limitations  Documented POC (choose all that apply):  Measurable short and long term/discharge treatment goals related to physical and functional deficits.;Frequency of treatment visits and treatment activities to address deficit areas.;Patient agrees to program participation including home program  Briefly describe symptoms:  (P) Pain, stiffness/loss of motion, and edema  How did the symptoms start:  (P) Closing car door on finger in April  Average pain/intensity last 24 hours:  (P) 3/10  Average pain/intensity past week:  (P) 3/10  Frequency of Symptoms:  (P) Intermittently (0-25% of the time)  Symptom impact on ADLs:  (P) A little bit  Condition change since eval:  (P) N/A (first visit)  General health reported by patient:  (P) Good     Presenting condition or subjective complaint: Little finger smashed in car door  Date of onset: 05/29/25 (DO Order Date ~ DOI: April 2025)    Relevant medical history: Overweight   No past medical history on file.    Prior diagnostic imaging/testing results: MRI; X-ray     This redemonstrates her known fracture at the distal middle phalanx, there is some mild volar displacement.  Prior therapy history for the same diagnosis, illness or injury: No      Prior Level of Function  Transfers: Independent  Ambulation: Independent  ADL: Independent  IADL: Independent    Living Environment  Social support: Alone   Type of home: House; 1 level   Stairs to enter the home: No       Ramp: No   Stairs inside the home: No       Help at home: None  Equipment owned:   None listed    Employment: No     Hobbies/Interests:  None listed    Patient goals for therapy: ?    Pain assessment: Pain present  See objective evaluation for additional pain details     Objective   ADDITIONAL HISTORY:  Right hand dominant  Patient reports symptoms of pain, stiffness/loss of motion, and edema  Transportation: drives    Functional Outcome Measure:   Upper Extremity Functional Index Score:  SCORE:   Column Totals: /80: (Patient-Rptd) 70   (A lower score indicates greater disability.)    PAIN:  Pain Level at Rest: 0/10  Pain Level with Use: 3/10  Pain Location: L SF P2 and DIP  Pain Quality: Aching and Dull  Pain Frequency: intermittent or with specific activity  Pain is Worst: daytime  Pain is Exacerbated By: driving, holding onto phone while supporting phone on finger  Pain is Relieved By: rest and topical cream, tylonel  Pain Progression: Improved    EDEMA:   Finger/Thumb   (Circumference measured in cm) Left Right   Small P1 6.6 6.4   PIP 6.1 5.5   P2 5.9 5.0   DIP 5.1 4.5     SENSATION: WNL throughout all nerve distributions; per patient report     ROM:   Hand ROM  Left AROM Right AROM    Small MP 0/82 0/84   PIP 0/73 0/85   DIP 0/53 0/79   Total Active Motion 208 248     STRENGTH:     Measured in pounds 6/26/2025 6/26/2025    Left Right   Trial 1 53 57     Assessment & Plan   CLINICAL IMPRESSIONS  Medical Diagnosis: Injury of finger of left hand    Treatment Diagnosis: Injury of finger of left hand    Impression/Assessment: Pt is a 75 year old female presenting to Occupational Therapy due to injury of finger of left hand.  The following significant findings have been identified: Impaired ROM and Pain.  These identified deficits interfere with their ability to perform household chores and meal planning and preparation as compared to previous level of function.   Patient's limitations or Problem List includes: Pain, Decreased ROM/motion, Increased edema, Decreased stability, Decreased coordination, Decreased dexterity,  Tightness in musculature, and Adherence in connective tissue of the left small finger which interferes with the patient's ability to perform Household Chores as compared to previous level of function.    Clinical Decision Making (Complexity):  Assessment of Occupational Performance: 1-3 Performance Deficits  Occupational Performance Limitations: home establishment and management and meal preparation and cleanup  Clinical Decision Making (Complexity): Low complexity    PLAN OF CARE  Treatment Interventions:  Modalities:  US and Paraffin  Therapeutic Exercise:  AROM, AAROM, PROM, Tendon Gliding, Blocking, Reverse Blocking, Place and Hold, Contract Relax, Extensor Tracking, Isotonics, Isometrics, and Stabilization  Neuromuscular re-education:  Nerve Gliding, Sensory re-education, Desensitization, Kinesthetic Training, Proprioceptive Training, Posture, Kinesiotaping, Isometrics, and Stabilization  Manual Techniques:  Coordination/Dexterity, Joint mobilization, Friction massage, Myofascial release, and Manual edema mobilization  Orthotic Fabrication:  Static, Static progressive, Dynamic, Finger based, and Hand based  Self Care:  Self Care Tasks and Ergonomic Considerations    Long Term Goals   OT Goal 1  Goal Identifier: Open a Jar  Goal Description: Pt will report no difficulty with gripping and twisting regarding to open a jar  Rationale: In order to maximize safety and independence with ADL/IADLs  Goal Progress: Moderate difficulty  Target Date: (P) 09/26/25      Frequency of Treatment: 1x months  Duration of Treatment: 3 months     Education Assessment: Learner/Method: (P) Patient;Demonstration;Pictures/Video  Education Comments: (P) PTRX via printout     Risks and benefits of evaluation/treatment have been explained.   Patient/Family/caregiver agrees with Plan of Care.     Evaluation Time:    OT Eval, Low Complexity Minutes (50472): (P) 18  Signing Clinician: Kylie Choudhary OT        [facility]  Rehabilitation Services                                                                                   OUTPATIENT OCCUPATIONAL THERAPY      PLAN OF TREATMENT FOR OUTPATIENT REHABILITATION   Patient's Last Name, First Name, Lisa Campos YOB: 1950   Provider's [facility]   [facility]   Medical Record No.  [id number]     Onset Date: 05/29/25 (DO Order Date ~ DOI: April 2025) Start of Care Date: 06/26/25     Medical Diagnosis:  Injury of finger of left hand      OT Treatment Diagnosis:  Injury of finger of left hand Plan of Treatment  Frequency/Duration:1x months/3 months    Certification date from 06/26/25   To 09/23/25        See note for plan of treatment details and functional goals     Kylie Choudhary OT                         I CERTIFY THE NEED FOR THESE SERVICES FURNISHED UNDER        THIS PLAN OF TREATMENT AND WHILE UNDER MY CARE     (Physician attestation of this document indicates review and certification of the therapy plan).              Referring Provider:  Radames Vieira    Initial Assessment  See Epic Evaluation- 06/26/25

## (undated) RX ORDER — IVABRADINE 5 MG/1
TABLET, FILM COATED ORAL
Status: DISPENSED
Start: 2023-02-01

## (undated) RX ORDER — METOPROLOL TARTRATE 50 MG
TABLET ORAL
Status: DISPENSED
Start: 2023-02-01